# Patient Record
Sex: FEMALE | Race: WHITE | NOT HISPANIC OR LATINO | Employment: STUDENT | URBAN - METROPOLITAN AREA
[De-identification: names, ages, dates, MRNs, and addresses within clinical notes are randomized per-mention and may not be internally consistent; named-entity substitution may affect disease eponyms.]

---

## 2018-05-07 ENCOUNTER — APPOINTMENT (EMERGENCY)
Dept: CT IMAGING | Facility: HOSPITAL | Age: 43
End: 2018-05-07
Payer: COMMERCIAL

## 2018-05-07 ENCOUNTER — HOSPITAL ENCOUNTER (EMERGENCY)
Facility: HOSPITAL | Age: 43
Discharge: HOME | End: 2018-05-08
Attending: EMERGENCY MEDICINE | Admitting: EMERGENCY MEDICINE
Payer: COMMERCIAL

## 2018-05-07 DIAGNOSIS — S06.0X1A CONCUSSION WITH LOSS OF CONSCIOUSNESS OF 30 MINUTES OR LESS, INITIAL ENCOUNTER: ICD-10-CM

## 2018-05-07 DIAGNOSIS — K04.7 DENTAL ABSCESS: ICD-10-CM

## 2018-05-07 DIAGNOSIS — S09.90XA CLOSED HEAD INJURY, INITIAL ENCOUNTER: Primary | ICD-10-CM

## 2018-05-07 PROCEDURE — 72125 CT NECK SPINE W/O DYE: CPT

## 2018-05-07 PROCEDURE — 70450 CT HEAD/BRAIN W/O DYE: CPT

## 2018-05-07 RX ORDER — HYDROCODONE BITARTRATE AND ACETAMINOPHEN 5; 325 MG/1; MG/1
1 TABLET ORAL ONCE
Status: COMPLETED | OUTPATIENT
Start: 2018-05-07 | End: 2018-05-07

## 2018-05-07 RX ORDER — GINSENG 100 MG
1 CAPSULE ORAL ONCE
Status: COMPLETED | OUTPATIENT
Start: 2018-05-07 | End: 2018-05-07

## 2018-05-07 RX ADMIN — HYDROCODONE BITARTRATE AND ACETAMINOPHEN 1 TABLET: 5; 325 TABLET ORAL at 23:38

## 2018-05-07 RX ADMIN — BACITRACIN ZINC 1 LARGE APPLICATION: 500 OINTMENT TOPICAL at 23:39

## 2018-05-08 VITALS
TEMPERATURE: 99.3 F | SYSTOLIC BLOOD PRESSURE: 104 MMHG | WEIGHT: 175 LBS | RESPIRATION RATE: 18 BRPM | DIASTOLIC BLOOD PRESSURE: 70 MMHG | HEART RATE: 89 BPM | BODY MASS INDEX: 34.18 KG/M2 | OXYGEN SATURATION: 98 %

## 2018-05-08 PROCEDURE — 99284 EMERGENCY DEPT VISIT MOD MDM: CPT

## 2018-05-08 RX ORDER — HYDROCODONE BITARTRATE AND ACETAMINOPHEN 5; 325 MG/1; MG/1
1 TABLET ORAL EVERY 6 HOURS PRN
Qty: 12 TABLET | Refills: 0 | Status: SHIPPED | OUTPATIENT
Start: 2018-05-08

## 2018-05-08 RX ORDER — CLINDAMYCIN HYDROCHLORIDE 300 MG/1
300 CAPSULE ORAL 4 TIMES DAILY
Qty: 40 CAPSULE | Refills: 0 | Status: SHIPPED | OUTPATIENT
Start: 2018-05-08 | End: 2018-05-18

## 2018-05-08 NOTE — DISCHARGE INSTRUCTIONS
Concussion   WHAT YOU NEED TO KNOW:   A concussion is a mild brain injury  It is usually caused by a bump or blow to the head from a fall, a motor vehicle crash, or a sports injury  Sometimes being shaken forcefully may cause a concussion  DISCHARGE INSTRUCTIONS:   Have someone else call 911 for the following:   · Someone tries to wake you and cannot do so  · You have a seizure, increasing confusion, or a change in personality  · Your speech becomes slurred, or you have new vision problems  Return to the emergency department if:   · You have a severe headache that does not go away  · You have arm or leg weakness, numbness, or new problems with coordination  · You have blood or clear fluid coming out of the ears or nose  Contact your healthcare provider if:   · You have nausea or are vomiting  · You feel more sleepy than usual     · Your symptoms get worse  · Your symptoms last longer than 6 weeks after the injury  · You have questions or concerns about your condition or care  Medicines:   · Acetaminophen  helps to decrease pain  It is available without a doctor's order  Ask how much to take and how often to take it  Follow directions  Acetaminophen can cause liver damage if not taken correctly  · NSAIDs , such as ibuprofen, help decrease swelling and pain  NSAIDs can cause stomach bleeding or kidney problems in certain people  If you take blood thinner medicine, always ask your healthcare provider if NSAIDs are safe for you  Always read the medicine label and follow directions  · Take your medicine as directed  Contact your healthcare provider if you think your medicine is not helping or if you have side effects  Tell him or her if you are allergic to any medicine  Keep a list of the medicines, vitamins, and herbs you take  Include the amounts, and when and why you take them  Bring the list or the pill bottles to follow-up visits   Carry your medicine list with you in case of an emergency  Follow up with your healthcare provider as directed:  Write down your questions so you remember to ask them during your visits  Self-care:   · Rest  from physical and mental activities as directed  Mental activities are those that require thinking, concentration, and attention  You will need to rest until your symptoms are gone  Rest will allow you to recover from your concussion  Ask your healthcare provider when you can return to work and other daily activities  · Have someone stay with you for the first 24 hours after your injury  Your healthcare provider should be contacted if your symptoms get worse, or you develop new symptoms  · Do not participate in sports and physical activities until your healthcare provider says it is okay  They could make your symptoms worse or lead to another concussion  Your healthcare provider will tell you when it is okay for you to return to sports or physical activities  Prevent another concussion:   · Wear protective sports equipment that fit properly  Helmets help decrease your risk of a serious brain injury  Talk to your healthcare provider about ways you can decrease your risk for a concussion if you play sports  · Wear your seat belt  every time you travel  This helps to decrease your risk of a head injury if you are in a car accident  © 2017 [facility] Information is for End User's use only and may not be sold, redistributed or otherwise used for commercial purposes  All illustrations and images included in CareNotes® are the copyrighted property of [employer]  or [employer]  The above information is an  only  It is not intended as medical advice for individual conditions or treatments  Talk to your doctor, nurse or pharmacist before following any medical regimen to see if it is safe and effective for you      Dental Abscess   WHAT YOU NEED TO KNOW:   A dental abscess is a collection of pus in or around a tooth  A dental abscess is caused by bacteria  The bacteria usually enter the tooth when the enamel (outer part of the tooth) is damaged by tooth decay  Bacteria may also enter the tooth through a break or chip in the tooth, or a cut in the gum  Food particles that are stuck between the teeth for a long time may also lead to an abscess  DISCHARGE INSTRUCTIONS:   Return to the emergency department if:   · You have severe pain  · You have trouble breathing because of pain or swelling  Contact your healthcare provider if:   · Your symptoms get worse, even after treatment  · Your mouth is bleeding  · You cannot eat or drink because of pain or swelling  · Your abscess returns  · You have an injury that causes a crack in your tooth  · You have questions or concerns about your condition or care  Medicines: You may  need any of the following:  · Antibiotics  help treat a bacterial infection  · NSAIDs , such as ibuprofen, help decrease swelling, pain, and fever  This medicine is available with or without a doctor's order  NSAIDs can cause stomach bleeding or kidney problems in certain people  If you take blood thinner medicine, always ask your healthcare provider if NSAIDs are safe for you  Always read the medicine label and follow directions  · Acetaminophen  decreases pain and fever  It is available without a doctor's order  Ask how much to take and how often to take it  Follow directions  Read the labels of all other medicines you are using to see if they also contain acetaminophen, or ask your doctor or pharmacist  Acetaminophen can cause liver damage if not taken correctly  Do not use more than 4 grams (4,000 milligrams) total of acetaminophen in one day  · Prescription pain medicine  may be given  Ask your healthcare provider how to take this medicine safely  Some prescription pain medicines contain acetaminophen   Do not take other medicines that contain acetaminophen without talking to your healthcare provider  Too much acetaminophen may cause liver damage  Prescription pain medicine may cause constipation  Ask your healthcare provider how to prevent or treat constipation  · Take your medicine as directed  Contact your healthcare provider if you think your medicine is not helping or if you have side effects  Tell him of her if you are allergic to any medicine  Keep a list of the medicines, vitamins, and herbs you take  Include the amounts, and when and why you take them  Bring the list or the pill bottles to follow-up visits  Carry your medicine list with you in case of an emergency  Self-care:   · Rinse your mouth every 2 hours with salt water  This will help keep the area clean  · Gently brush your teeth twice a day with a soft tooth brush  This will help keep the area clean  · Eat soft foods as directed  Soft foods may cause less pain  Examples include applesauce, yogurt, and cooked pasta  Ask your healthcare provider how long to follow this instruction  · Apply a warm compress to your tooth or gum  Use a cotton ball or gauze soaked in warm water  Remove the compress in 10 minutes or when it becomes cool  Repeat 3 times a day  Prevent another abscess:   · Brush your teeth at least 2 times a day with fluoride toothpaste  · Use dental floss to clean between your teeth at least once a day  · Rinse your mouth with water or mouthwash after meals and snacks  · Chew sugarless gum after meals and snacks  · Limit foods that are sticky and high in sugar such as raisons  Also limit drinks high in sugar, such as soda  · See your dentist every 6 months for dental cleanings and oral exams  Follow up with your healthcare provider in 24 hours: Your healthcare provider will need to check your teeth and gums  Write down your questions so you remember to ask them during your visits     © 2017 [employer] Information is for End User's use only and may not be sold, redistributed or otherwise used for commercial purposes  All illustrations and images included in CareNotes® are the copyrighted property of [employer]  or Jose Rodriguez  The above information is an  only  It is not intended as medical advice for individual conditions or treatments  Talk to your doctor, nurse or pharmacist before following any medical regimen to see if it is safe and effective for you

## 2018-05-08 NOTE — ED NOTES
Patient transported to [facility] [address] [place] [address]  05/07/18 1443

## 2018-08-31 ENCOUNTER — HOSPITAL ENCOUNTER (EMERGENCY)
Facility: HOSPITAL | Age: 43
Discharge: HOME | End: 2018-09-01
Attending: EMERGENCY MEDICINE
Payer: COMMERCIAL

## 2018-08-31 DIAGNOSIS — R10.9 ABDOMINAL PAIN: Primary | ICD-10-CM

## 2018-08-31 DIAGNOSIS — K57.92 DIVERTICULITIS: ICD-10-CM

## 2018-08-31 LAB
BACTERIA UR QL AUTO: ABNORMAL /HPF
BASOPHILS # BLD AUTO: 0.04 THOUSANDS/ΜL (ref 0–0.1)
BASOPHILS NFR BLD AUTO: 1 % (ref 0–1)
BILIRUB UR QL STRIP: NEGATIVE
CLARITY UR: CLEAR
COLOR UR: YELLOW
EOSINOPHIL # BLD AUTO: 0.12 THOUSAND/ΜL (ref 0–0.61)
EOSINOPHIL NFR BLD AUTO: 2 % (ref 0–6)
ERYTHROCYTE [DISTWIDTH] IN BLOOD BY AUTOMATED COUNT: 12.7 % (ref 11.6–15.1)
GLUCOSE UR STRIP-MCNC: NEGATIVE MG/DL
HCT VFR BLD AUTO: 42.8 % (ref 34.8–46.1)
HGB BLD-MCNC: 14.4 G/DL (ref 11.5–15.4)
HGB UR QL STRIP.AUTO: ABNORMAL
IMM GRANULOCYTES # BLD AUTO: 0.04 THOUSAND/UL (ref 0–0.2)
IMM GRANULOCYTES NFR BLD AUTO: 1 % (ref 0–2)
KETONES UR STRIP-MCNC: ABNORMAL MG/DL
LEUKOCYTE ESTERASE UR QL STRIP: NEGATIVE
LYMPHOCYTES # BLD AUTO: 1.84 THOUSANDS/ΜL (ref 0.6–4.47)
LYMPHOCYTES NFR BLD AUTO: 23 % (ref 14–44)
MCH RBC QN AUTO: 30.1 PG (ref 26.8–34.3)
MCHC RBC AUTO-ENTMCNC: 33.6 G/DL (ref 31.4–37.4)
MCV RBC AUTO: 90 FL (ref 82–98)
MONOCYTES # BLD AUTO: 0.56 THOUSAND/ΜL (ref 0.17–1.22)
MONOCYTES NFR BLD AUTO: 7 % (ref 4–12)
NEUTROPHILS # BLD AUTO: 5.49 THOUSANDS/ΜL (ref 1.85–7.62)
NEUTS SEG NFR BLD AUTO: 66 % (ref 43–75)
NITRITE UR QL STRIP: NEGATIVE
NON-SQ EPI CELLS URNS QL MICRO: ABNORMAL /HPF
NRBC BLD AUTO-RTO: 0 /100 WBCS
PH UR STRIP.AUTO: 6 [PH] (ref 4.5–8)
PLATELET # BLD AUTO: 274 THOUSANDS/UL (ref 149–390)
PMV BLD AUTO: 10.8 FL (ref 8.9–12.7)
PROT UR STRIP-MCNC: NEGATIVE MG/DL
RBC # BLD AUTO: 4.78 MILLION/UL (ref 3.81–5.12)
RBC #/AREA URNS AUTO: ABNORMAL /HPF
SP GR UR STRIP.AUTO: 1.02 (ref 1–1.03)
UROBILINOGEN UR QL STRIP.AUTO: 1 E.U./DL
WBC # BLD AUTO: 8.09 THOUSAND/UL (ref 4.31–10.16)
WBC #/AREA URNS AUTO: ABNORMAL /HPF

## 2018-08-31 PROCEDURE — 81001 URINALYSIS AUTO W/SCOPE: CPT | Performed by: EMERGENCY MEDICINE

## 2018-08-31 PROCEDURE — 36415 COLL VENOUS BLD VENIPUNCTURE: CPT | Performed by: EMERGENCY MEDICINE

## 2018-08-31 PROCEDURE — 83605 ASSAY OF LACTIC ACID: CPT | Performed by: EMERGENCY MEDICINE

## 2018-08-31 PROCEDURE — 85025 COMPLETE CBC W/AUTO DIFF WBC: CPT | Performed by: EMERGENCY MEDICINE

## 2018-08-31 PROCEDURE — 96375 TX/PRO/DX INJ NEW DRUG ADDON: CPT

## 2018-08-31 PROCEDURE — 80053 COMPREHEN METABOLIC PANEL: CPT | Performed by: EMERGENCY MEDICINE

## 2018-08-31 PROCEDURE — 96361 HYDRATE IV INFUSION ADD-ON: CPT

## 2018-08-31 RX ORDER — ONDANSETRON 2 MG/ML
4 INJECTION INTRAMUSCULAR; INTRAVENOUS ONCE
Status: COMPLETED | OUTPATIENT
Start: 2018-08-31 | End: 2018-08-31

## 2018-08-31 RX ADMIN — SODIUM CHLORIDE 1000 ML: 0.9 INJECTION, SOLUTION INTRAVENOUS at 23:44

## 2018-08-31 RX ADMIN — HYDROMORPHONE HYDROCHLORIDE 1 MG: 1 INJECTION, SOLUTION INTRAMUSCULAR; INTRAVENOUS; SUBCUTANEOUS at 23:46

## 2018-08-31 RX ADMIN — ONDANSETRON 4 MG: 2 INJECTION INTRAMUSCULAR; INTRAVENOUS at 23:45

## 2018-09-01 ENCOUNTER — APPOINTMENT (EMERGENCY)
Dept: CT IMAGING | Facility: HOSPITAL | Age: 43
End: 2018-09-01
Payer: COMMERCIAL

## 2018-09-01 VITALS
TEMPERATURE: 98.4 F | DIASTOLIC BLOOD PRESSURE: 75 MMHG | RESPIRATION RATE: 18 BRPM | OXYGEN SATURATION: 96 % | SYSTOLIC BLOOD PRESSURE: 120 MMHG | HEART RATE: 100 BPM | WEIGHT: 175 LBS | HEIGHT: 60 IN | BODY MASS INDEX: 34.36 KG/M2

## 2018-09-01 LAB
ALBUMIN SERPL BCP-MCNC: 3.7 G/DL (ref 3.5–5)
ALP SERPL-CCNC: 61 U/L (ref 46–116)
ALT SERPL W P-5'-P-CCNC: 36 U/L (ref 12–78)
ANION GAP SERPL CALCULATED.3IONS-SCNC: 5 MMOL/L (ref 4–13)
AST SERPL W P-5'-P-CCNC: 20 U/L (ref 5–45)
BILIRUB SERPL-MCNC: 0.2 MG/DL (ref 0.2–1)
BUN SERPL-MCNC: 12 MG/DL (ref 5–25)
CALCIUM SERPL-MCNC: 9.3 MG/DL (ref 8.3–10.1)
CHLORIDE SERPL-SCNC: 103 MMOL/L (ref 100–108)
CO2 SERPL-SCNC: 30 MMOL/L (ref 21–32)
CREAT SERPL-MCNC: 0.77 MG/DL (ref 0.6–1.3)
GFR SERPL CREATININE-BSD FRML MDRD: 94 ML/MIN/1.73SQ M
GLUCOSE SERPL-MCNC: 118 MG/DL (ref 65–140)
LACTATE SERPL-SCNC: 0.9 MMOL/L (ref 0.5–2)
POTASSIUM SERPL-SCNC: 3.3 MMOL/L (ref 3.5–5.3)
PROT SERPL-MCNC: 7.3 G/DL (ref 6.4–8.2)
SODIUM SERPL-SCNC: 138 MMOL/L (ref 136–145)

## 2018-09-01 PROCEDURE — 96368 THER/DIAG CONCURRENT INF: CPT

## 2018-09-01 PROCEDURE — 96365 THER/PROPH/DIAG IV INF INIT: CPT

## 2018-09-01 PROCEDURE — 96366 THER/PROPH/DIAG IV INF ADDON: CPT

## 2018-09-01 PROCEDURE — 74177 CT ABD & PELVIS W/CONTRAST: CPT

## 2018-09-01 PROCEDURE — 99285 EMERGENCY DEPT VISIT HI MDM: CPT

## 2018-09-01 PROCEDURE — 96361 HYDRATE IV INFUSION ADD-ON: CPT

## 2018-09-01 PROCEDURE — 96376 TX/PRO/DX INJ SAME DRUG ADON: CPT

## 2018-09-01 RX ORDER — METRONIDAZOLE 500 MG/1
500 TABLET ORAL EVERY 8 HOURS SCHEDULED
Qty: 30 TABLET | Refills: 0 | Status: SHIPPED | OUTPATIENT
Start: 2018-09-01 | End: 2018-09-11

## 2018-09-01 RX ORDER — LEVOFLOXACIN 750 MG/1
750 TABLET ORAL EVERY 24 HOURS
Qty: 10 TABLET | Refills: 0 | Status: SHIPPED | OUTPATIENT
Start: 2018-09-01 | End: 2018-09-11

## 2018-09-01 RX ORDER — LEVOFLOXACIN 5 MG/ML
750 INJECTION, SOLUTION INTRAVENOUS ONCE
Status: COMPLETED | OUTPATIENT
Start: 2018-09-01 | End: 2018-09-01

## 2018-09-01 RX ORDER — OXYCODONE HYDROCHLORIDE AND ACETAMINOPHEN 5; 325 MG/1; MG/1
1-2 TABLET ORAL EVERY 4 HOURS PRN
Qty: 20 TABLET | Refills: 0 | Status: SHIPPED | OUTPATIENT
Start: 2018-09-01 | End: 2018-09-11

## 2018-09-01 RX ORDER — POTASSIUM CHLORIDE 20 MEQ/1
20 TABLET, EXTENDED RELEASE ORAL ONCE
Status: COMPLETED | OUTPATIENT
Start: 2018-09-01 | End: 2018-09-01

## 2018-09-01 RX ADMIN — METRONIDAZOLE 500 MG: 500 INJECTION, SOLUTION INTRAVENOUS at 02:02

## 2018-09-01 RX ADMIN — HYDROMORPHONE HYDROCHLORIDE 1 MG: 1 INJECTION, SOLUTION INTRAMUSCULAR; INTRAVENOUS; SUBCUTANEOUS at 02:02

## 2018-09-01 RX ADMIN — HYDROMORPHONE HYDROCHLORIDE 1 MG: 1 INJECTION, SOLUTION INTRAMUSCULAR; INTRAVENOUS; SUBCUTANEOUS at 04:21

## 2018-09-01 RX ADMIN — LEVOFLOXACIN 750 MG: 5 INJECTION, SOLUTION INTRAVENOUS at 02:30

## 2018-09-01 RX ADMIN — POTASSIUM CHLORIDE 20 MEQ: 1500 TABLET, EXTENDED RELEASE ORAL at 00:11

## 2018-09-01 RX ADMIN — IOHEXOL 100 ML: 350 INJECTION, SOLUTION INTRAVENOUS at 00:40

## 2018-09-01 NOTE — ED CARE HANDOFF
Emergency Department Sign Out Note        Sign out and transfer of care from Dr Sean Pollard  See Separate Emergency Department note  The patient, Selene Johns, was evaluated by the previous provider for abdominal pain  Workup Completed:  Labs, CT A/P     ED Course / Workup Pending (followup):  reeval after IV antibiotics to determine admit vs d/c based on how patient feels  Procedures  MDM  Number of Diagnoses or Management Options  Abdominal pain:   Diverticulitis:   Diagnosis management comments: 4:17 AM  Patient states shes' still having pain but would prefer to go home  RTED instructions reviewed  Discharge instructions provided by Dr Sean Pollard  Amount and/or Complexity of Data Reviewed  Review and summarize past medical records: yes    Risk of Complications, Morbidity, and/or Mortality  Presenting problems: high  Diagnostic procedures: high  Management options: moderate    Patient Progress  Patient progress: stable    CritCare Time      Disposition  Final diagnoses:   Abdominal pain   Diverticulitis     Time reflects when diagnosis was documented in both MDM as applicable and the Disposition within this note     Time User Action Codes Description Comment    8/31/2018 11:37 PM Juan Bean Add [R10 9] Abdominal pain     9/1/2018  1:57 AM Juan Bean Add [K57 92] Diverticulitis       ED Disposition     ED Disposition Condition Comment    Discharge  Selene Johns discharge to home/self care      Condition at discharge: Stable        Follow-up Information     Follow up With Specialties Details [facility] Emergency Medicine Go to If symptoms worsen:  Worsening abdominal pain, fever, chills, vomiting, lightheadedness, passing out, more blood in her bowel movements, etc  hospitals [facility] [address]  [phone] [place] ED, [address], [place], [address] [facility],  Internal Medicine Schedule an appointment as soon as possible for a visit For follow-up to ensure improvement Pattiee [place] [address]  [facility] [address] [facility] [place] MD Gastroenterology Schedule an appointment as soon as possible for a visit For GI follow-up [address]  [facility] [phone]           Patient's Medications   Discharge Prescriptions    LEVOFLOXACIN (LEVAQUIN) 750 MG TABLET    Take 1 tablet (750 mg total) by mouth every 24 hours for 10 days       Start Date: 9/1/2018  End Date: 9/11/2018       Order Dose: 750 mg       Quantity: 10 tablet    Refills: 0    METRONIDAZOLE (FLAGYL) 500 MG TABLET    Take 1 tablet (500 mg total) by mouth every 8 (eight) hours for 10 days       Start Date: 9/1/2018  End Date: 9/11/2018       Order Dose: 500 mg       Quantity: 30 tablet    Refills: 0    OXYCODONE-ACETAMINOPHEN (PERCOCET) 5-325 MG PER TABLET    Take 1-2 tablets by mouth every 4 (four) hours as needed for severe pain for up to 10 days Max Daily Amount: 12 tablets       Start Date: 9/1/2018  End Date: 9/11/2018       Order Dose: 1-2 tablets       Quantity: 20 tablet    Refills: 0     No discharge procedures on file         ED Provider  Electronically Signed by     Javy Smith DO  09/01/18 4696

## 2018-09-01 NOTE — ED PROVIDER NOTES
History  Chief Complaint   Patient presents with    Rectal Bleeding     Pt c/o LLQ abdominal pain with rectal bleeding  Pt has hx of diverticulitis but hasn't had a flare up in a while  Pt states this has been going on for over a week but getting worse with a low grade fever at home  68-year-old female presents for evaluation of left lower quadrant abdominal pain associated with rectal bleeding  She states that she has been having pain for the past week, she was hoping it could be alleviated at home by using a liquid diet  However her pain continues to worsen and she now presents here for evaluation and treatment  She past she has also required sigmoid colon resection secondary to her diverticulitis  She had been advised at one point for a total colectomy however she refused, and states that has been a few years since she has had a diverticulitis flare up  She denies dysuria  She states that she has not had a solid bowel movement in approximately 1 week, she has blood in the toilet every time she attempts to have a bowel movement, also clots  She states she always bleeds some with her diverticulitis but believes that this is worse  Prior to Admission Medications   Prescriptions Last Dose Informant Patient Reported? Taking?    ALPRAZolam (XANAX) 0 5 mg tablet   Yes No   Sig: Take 0 5 mg by mouth as needed for anxiety   HYDROcodone-acetaminophen (NORCO) 5-325 mg per tablet   No No   Sig: Take 1 tablet by mouth every 6 (six) hours as needed (Not relieved by Anti-inflammatory) for up to 12 doses Max Daily Amount: 4 tablets   pregabalin (LYRICA) 75 mg capsule   No No   Sig: Take 1 capsule by mouth 2 (two) times a day for 10 days   valACYclovir (VALTREX) 1,000 mg tablet   No No   Sig: Take 1 tablet by mouth every 8 (eight) hours for 5 days      Facility-Administered Medications: None       Past Medical History:   Diagnosis Date    Atrial septal defect     Mitral valve prolapse        Past Surgical History:   Procedure Laterality Date    APPENDECTOMY       SECTION      CHOLECYSTECTOMY      HYSTERECTOMY      SIGMOID RESECTION / RECTOPEXY         Family History   Problem Relation Age of Onset    Adopted: Yes    No Known Problems Mother     No Known Problems Father      I have reviewed and agree with the history as documented  Social History   Substance Use Topics    Smoking status: Current Every Day Smoker     Packs/day: 0 50     Types: Cigarettes    Smokeless tobacco: Never Used    Alcohol use No        Review of Systems   Constitutional: Positive for fatigue  Negative for chills and fever  HENT: Negative for congestion and sore throat  Respiratory: Negative for cough, chest tightness and shortness of breath  Cardiovascular: Negative for chest pain and palpitations  Gastrointestinal: Positive for abdominal distention, abdominal pain (Left lower quadrant), blood in stool and nausea  Negative for anal bleeding, constipation, diarrhea and vomiting  Genitourinary: Negative for dysuria, flank pain, vaginal bleeding, vaginal discharge and vaginal pain  Musculoskeletal: Negative for back pain and neck pain  Skin: Negative for color change and rash  Allergic/Immunologic: Negative for immunocompromised state  Neurological: Negative for dizziness, syncope and headaches  Physical Exam  Physical Exam   Constitutional: She is oriented to person, place, and time  She appears well-developed and well-nourished  She appears distressed (Secondary to pain)  HENT:   Head: Normocephalic and atraumatic  Mouth/Throat: Oropharynx is clear and moist    Eyes: Conjunctivae and EOM are normal  Pupils are equal, round, and reactive to light  Right eye exhibits no discharge  Left eye exhibits no discharge  No scleral icterus  Neck: Normal range of motion  Neck supple  No JVD present  Cardiovascular: Regular rhythm, normal heart sounds and intact distal pulses    Exam reveals no gallop and no friction rub  No murmur heard  Mild tachycardia   Pulmonary/Chest: Effort normal and breath sounds normal  No respiratory distress  She has no wheezes  She has no rales  She exhibits no tenderness  Abdominal: Soft  Bowel sounds are normal  She exhibits distension  There is tenderness ( left lower quadrant)  There is guarding ( left lower quadrant)  There is no rebound  Musculoskeletal: Normal range of motion  She exhibits no edema, tenderness or deformity  Neurological: She is alert and oriented to person, place, and time  No cranial nerve deficit or sensory deficit  Skin: Skin is warm and dry  No rash noted  She is not diaphoretic  No erythema  No pallor  Psychiatric: She has a normal mood and affect  Her behavior is normal    Vitals reviewed        Vital Signs  ED Triage Vitals   Temperature Pulse Respirations Blood Pressure SpO2   08/31/18 2248 08/31/18 2247 08/31/18 2247 08/31/18 2247 08/31/18 2247   98 4 °F (36 9 °C) (!) 106 20 158/97 99 %      Temp Source Heart Rate Source Patient Position - Orthostatic VS BP Location FiO2 (%)   08/31/18 2248 08/31/18 2247 08/31/18 2247 08/31/18 2247 --   Oral Monitor Lying Right arm       Pain Score       08/31/18 2247       8           Vitals:    08/31/18 2247 09/01/18 0129 09/01/18 0425   BP: 158/97 123/81 120/75   Pulse: (!) 106 98 100   Patient Position - Orthostatic VS: Lying Sitting Sitting       Visual Acuity      ED Medications  Medications   sodium chloride 0 9 % bolus 1,000 mL (0 mL Intravenous Stopped 9/1/18 0202)   HYDROmorphone (DILAUDID) injection 1 mg (1 mg Intravenous Given 8/31/18 2346)   ondansetron (ZOFRAN) injection 4 mg (4 mg Intravenous Given 8/31/18 2345)   potassium chloride (K-DUR,KLOR-CON) CR tablet 20 mEq (20 mEq Oral Given 9/1/18 0011)   iohexol (OMNIPAQUE) 350 MG/ML injection (MULTI-DOSE) 100 mL (100 mL Intravenous Given 9/1/18 0040)   HYDROmorphone (DILAUDID) injection 1 mg (1 mg Intravenous Given 9/1/18 0202) metroNIDAZOLE (FLAGYL) IVPB (premix) 500 mg (0 mg Intravenous Stopped 9/1/18 0418)   levofloxacin (LEVAQUIN) IVPB (premix) 750 mg (0 mg Intravenous Stopped 9/1/18 0418)   HYDROmorphone (DILAUDID) injection 1 mg (1 mg Intravenous Given 9/1/18 0421)       Diagnostic Studies  Results Reviewed     Procedure Component Value Units Date/Time    Lactic acid, plasma [[id number]]  (Normal) Collected:  08/31/18 2340    Lab Status:  Final result Specimen:  Blood from Arm, Right Updated:  09/01/18 0008     LACTIC ACID 0 9 mmol/L     Narrative:         Result may be elevated if tourniquet was used during collection  Comprehensive metabolic panel [[id number]]  (Abnormal) Collected:  08/31/18 2340    Lab Status:  Final result Specimen:  Blood from Arm, Right Updated:  09/01/18 0005     Sodium 138 mmol/L      Potassium 3 3 (L) mmol/L      Chloride 103 mmol/L      CO2 30 mmol/L      ANION GAP 5 mmol/L      BUN 12 mg/dL      Creatinine 0 77 mg/dL      Glucose 118 mg/dL      Calcium 9 3 mg/dL      AST 20 U/L      ALT 36 U/L      Alkaline Phosphatase 61 U/L      Total Protein 7 3 g/dL      Albumin 3 7 g/dL      Total Bilirubin 0 20 mg/dL      eGFR 94 ml/min/1 73sq m     Narrative:         National Kidney Disease Education Program recommendations are as follows:  GFR calculation is accurate only with a steady state creatinine  Chronic Kidney disease less than 60 ml/min/1 73 sq  meters  Kidney failure less than 15 ml/min/1 73 sq  meters      Urine Microscopic [[id number]]  (Abnormal) Collected:  08/31/18 2340    Lab Status:  Final result Specimen:  Urine from Urine, Clean Catch Updated:  08/31/18 2359     RBC, UA 1-2 (A) /hpf      WBC, UA None Seen /hpf      Epithelial Cells Occasional /hpf      Bacteria, UA None Seen /hpf     Urinalysis with reflex to microscopic [[id number]]  (Abnormal) Collected:  08/31/18 2340    Lab Status:  Final result Specimen:  Urine from Urine, Clean Catch Updated:  08/31/18 2349     Color, UA Yellow     Clarity, UA Clear     Specific Gravity, UA 1 025     pH, UA 6 0     Leukocytes, UA Negative     Nitrite, UA Negative     Protein, UA Negative mg/dl      Glucose, UA Negative mg/dl      Ketones, UA Trace (A) mg/dl      Urobilinogen, UA 1 0 E U /dl      Bilirubin, UA Negative     Blood, UA Small (A)    CBC and differential [[id number]] Collected:  08/31/18 2340    Lab Status:  Final result Specimen:  Blood from Arm, Right Updated:  08/31/18 2347     WBC 8 09 Thousand/uL      RBC 4 78 Million/uL      Hemoglobin 14 4 g/dL      Hematocrit 42 8 %      MCV 90 fL      MCH 30 1 pg      MCHC 33 6 g/dL      RDW 12 7 %      MPV 10 8 fL      Platelets 253 Thousands/uL      nRBC 0 /100 WBCs      Neutrophils Relative 66 %      Immat GRANS % 1 %      Lymphocytes Relative 23 %      Monocytes Relative 7 %      Eosinophils Relative 2 %      Basophils Relative 1 %      Neutrophils Absolute 5 49 Thousands/µL      Immature Grans Absolute 0 04 Thousand/uL      Lymphocytes Absolute 1 84 Thousands/µL      Monocytes Absolute 0 56 Thousand/µL      Eosinophils Absolute 0 12 Thousand/µL      Basophils Absolute 0 04 Thousands/µL                  CT abdomen pelvis with contrast   ED Interpretation by Ericka Cardoso DO (09/01 0127)   3 mm nonobstructing stone the lower pole the right kidney        Fatty liver        Diverticulosis without evidence for diverticulitis  Final Result by Terrance Adhikari MD (09/01 0056)      3 mm nonobstructing stone the lower pole the right kidney  Fatty liver  Diverticulosis without evidence for diverticulitis  Workstation performed: [id number]                    Procedures  Procedures       Phone Contacts  ED Phone Contact    ED Course  ED Course as of Sep 05 1507   Sat Sep 01, 2018   0005 Potassium: (!) 3 3   0006 Getting IV fluid Pulse: (!) 106   0126 Hemoglobin: 14 4   0133 Patient's pain has worsened after the scan  Will give pain control    Will treat for diverticulitis    0156 Updated patient on the findings here  Patient will be given some IV antibiotics here and reassessed  As long as she is feeling improved she will be able to be discharged home with antibiotics and pain control for home  MDM  Number of Diagnoses or Management Options  Abdominal pain:   Diverticulitis:   Diagnosis management comments: Abdominal pain, likely diverticulitis flare up  Patient will have abdominal lab work performed including a lactate to look for ischemic bowel, imaging to evaluate for diverticulitis, abscess formation, or postsurgical complications, urinalysis, symptomatic treatment  Workup is negative however this was the same workup that patient was noted to have diverticulitis on further exam   Patient will be treated for diverticulitis  She is offered admission for her rectal bleeding however she would like to be discharged home  She will be discharged home with good follow-up and good return precautions in case of worsening condition  Amount and/or Complexity of Data Reviewed  Clinical lab tests: ordered and reviewed  Tests in the radiology section of CPT®: ordered and reviewed      CritCare Time    Disposition  Final diagnoses:   Abdominal pain   Diverticulitis     Time reflects when diagnosis was documented in both MDM as applicable and the Disposition within this note     Time User Action Codes Description Comment    8/31/2018 11:37 PM Howard Bean Add [R10 9] Abdominal pain     9/1/2018  1:57 AM Howard Bean Add [K57 92] Diverticulitis       ED Disposition     ED Disposition Condition Comment    Discharge  [address] [address] discharge to home/self care      Condition at discharge: Stable        Follow-up Information     Follow up With Specialties Details Why [facility] Emergency Medicine Go to If symptoms worsen:  Worsening abdominal pain, fever, chills, vomiting, lightheadedness, passing out, more blood in her bowel movements, etc  [address] [facility] [place] ED, [address] [place], [place], [facility],  Internal Medicine Schedule an appointment as soon as possible for a visit For follow-up to ensure improvement [address]  Chris Moe III, MD Gastroenterology Schedule an appointment as soon as possible for a visit For GI follow-up [place] [address]  [facility] [phone]             Discharge Medication List as of 9/1/2018  2:05 AM      START taking these medications    Details   levofloxacin (LEVAQUIN) 750 mg tablet Take 1 tablet (750 mg total) by mouth every 24 hours for 10 days, Starting Sat 9/1/2018, Until Tue 9/11/2018, Print      metroNIDAZOLE (FLAGYL) 500 mg tablet Take 1 tablet (500 mg total) by mouth every 8 (eight) hours for 10 days, Starting Sat 9/1/2018, Until Tue 9/11/2018, Print      oxyCODONE-acetaminophen (PERCOCET) 5-325 mg per tablet Take 1-2 tablets by mouth every 4 (four) hours as needed for severe pain for up to 10 days Max Daily Amount: 12 tablets, Starting Sat 9/1/2018, Until Tue 9/11/2018, Print         CONTINUE these medications which have NOT CHANGED    Details   ALPRAZolam (XANAX) 0 5 mg tablet Take 0 5 mg by mouth as needed for anxiety, Until Discontinued, Historical Med      HYDROcodone-acetaminophen ([facility]) 5-325 mg per tablet Take 1 tablet by mouth every 6 (six) hours as needed (Not relieved by Anti-inflammatory) for up to 12 doses Max Daily Amount: 4 tablets, Starting Tue 5/8/2018, Print      pregabalin (LYRICA) 75 mg capsule Take 1 capsule by mouth 2 (two) times a day for 10 days, Starting 10/20/2016, Until Sun 10/30/16, Print      valACYclovir (VALTREX) 1,000 mg tablet Take 1 tablet by mouth every 8 (eight) hours for 5 days, Starting 10/20/2016, Until Tue 10/25/16, Print           No discharge procedures on file      ED Provider  Electronically Signed by           Ephraim Aleman DO  09/05/18 5567

## 2018-09-01 NOTE — DISCHARGE INSTRUCTIONS
Abdominal Pain, Ambulatory Care   GENERAL INFORMATION:   Abdominal pain  can be dull, achy, or sharp  You may have pain in one area of your abdomen, or in your entire abdomen  Your pain may be caused by constipation, food sensitivity or poisoning, infection, or a blockage  Abdominal pain can also be caused by a hernia, appendicitis, or an ulcer  The cause of your abdominal pain may be unknown  Seek immediate care for the following symptoms:   · New chest pain or shortness of breath    · Pulsing pain in your upper abdomen or lower back that suddenly becomes constant    · Pain in the right lower abdominal area that worsens with movement    · Fever over 100 4°F (38°C) or shaking chills    · Vomiting and you cannot keep food or fluids down    · Pain does not improve or gets worse over the next 8 to 12 hours    · Blood in your vomit or bowel movements, or they look black and tarry    · Skin or the whites of your eyes turn yellow    · Large amount of vaginal bleeding that is not your monthly period  Treatment for abdominal pain  may include medicine to calm your stomach, prevent vomiting, or decrease pain  Follow up with your healthcare provider as directed:  Write down your questions so you remember to ask them during your visits  CARE AGREEMENT:   You have the right to help plan your care  Learn about your health condition and how it may be treated  Discuss treatment options with your caregivers to decide what care you want to receive  You always have the right to refuse treatment  The above information is an  only  It is not intended as medical advice for individual conditions or treatments  Talk to your doctor, nurse or pharmacist before following any medical regimen to see if it is safe and effective for you  © 2014 [address] is for End User's use only and may not be sold, redistributed or otherwise used for commercial purposes   All illustrations and images included in [facility] are the copyrighted property of [employer]  or Jose Rodriguez  Diverticulitis   WHAT YOU NEED TO KNOW:   What is diverticulitis? Diverticulitis is a condition that causes small pockets along your intestine called diverticula to become inflamed or infected  This is caused by hard bowel movement, food, or bacteria that get stuck in the pockets  What are the signs and symptoms of diverticulitis? · Pain in the lower left side of your abdomen    · Fever and chills    · Nausea or vomiting     · Constipation or diarrhea     · An urge to urinate or have a bowel movement more often than usual     · Bloody bowel movements    · Bloating and gas  How is diverticulitis diagnosed? Your healthcare provider will examine you  He or she will ask questions about your symptoms and health history  You may need any of the following:  · Blood and urine tests  may show signs of infection or inflammation  · CT scan or ultrasound  pictures may be used to find problems in your intestines  You may be given contrast liquid to help your intestines show up better in the pictures  Tell the healthcare provider if you have ever had an allergic reaction to contrast liquid  · A colonoscopy  is used to look at your intestines with a scope  A scope (long bendable tube with a light on the end) is used to take pictures  This test may show swollen diverticula or bleeding  Samples may be taken from your digestive tract and sent to a lab for tests  Bleeding may be controlled with tools that are inserted through the scope  How is diverticulitis treated? Mild diverticulitis can be treated at home  You may need to rest and follow a clear liquid diet until your diverticulitis gets better  You will be admitted to the hospital if you have severe diverticulitis  You may need any of the following:  · Antibiotics  help treat or prevent a bacterial infection  · Prescription pain medicine  may be given   Ask your healthcare provider how to take this medicine safely  Some prescription pain medicines contain acetaminophen  Do not take other medicines that contain acetaminophen without talking to your healthcare provider  Too much acetaminophen may cause liver damage  Prescription pain medicine may cause constipation  Ask your healthcare provider how to prevent or treat constipation  · An IV  may be used to give you liquids and nutrition  You may not be able to eat or drink anything until your healthcare provider says it is okay  · Drainage  may be done to reduce inflammation or treat infection  Your healthcare provider may insert a small tube through an incision in your abdomen to drain pus from infected diverticula  · Surgery  may be needed if there is a hole in your bowel or a large amount of swelling  A healthcare provider will remove the infected or inflamed areas of your colon  How can I manage my symptoms? A clear liquid diet will allow your intestines to heal  A clear liquid diet includes any liquids that you can see through  Examples include water, ginger-niru, cranberry or apple juice, frozen fruit ice, or broth  Ask your healthcare provider for more information on a clear liquid diet  When should I seek immediate care? · You have bowel movement or foul-smelling discharge leaking from your vagina or in your urine  · You have severe diarrhea  · You urinate less than usual or not at all  · You are not able to have a bowel movement  · You cannot stop vomiting  · You have severe abdominal pain, a fever, and your abdomen is larger than usual      · You have new or increased blood in your bowel movements  When should I contact my healthcare provider? · You have pain when you urinate  · Your symptoms get worse or do not go away  · You have questions or concerns about your condition or care  CARE AGREEMENT:   You have the right to help plan your care   Learn about your health condition and how it may be treated  Discuss treatment options with your caregivers to decide what care you want to receive  You always have the right to refuse treatment  The above information is an  only  It is not intended as medical advice for individual conditions or treatments  Talk to your doctor, nurse or pharmacist before following any medical regimen to see if it is safe and effective for you  © 2017 [address] Information is for End User's use only and may not be sold, redistributed or otherwise used for commercial purposes  All illustrations and images included in CareNotes® are the copyrighted property of A D A M , Inc  or Jose Rodriguez

## 2018-09-07 ENCOUNTER — APPOINTMENT (EMERGENCY)
Dept: CT IMAGING | Facility: HOSPITAL | Age: 43
DRG: 378 | End: 2018-09-07
Payer: COMMERCIAL

## 2018-09-07 ENCOUNTER — HOSPITAL ENCOUNTER (INPATIENT)
Facility: HOSPITAL | Age: 43
LOS: 1 days | Discharge: HOME | DRG: 378 | End: 2018-09-08
Attending: EMERGENCY MEDICINE | Admitting: FAMILY MEDICINE
Payer: COMMERCIAL

## 2018-09-07 DIAGNOSIS — R10.32 LEFT LOWER QUADRANT PAIN: ICD-10-CM

## 2018-09-07 DIAGNOSIS — K62.5 RECTAL BLEEDING: Primary | ICD-10-CM

## 2018-09-07 DIAGNOSIS — K57.90 DIVERTICULOSIS: ICD-10-CM

## 2018-09-07 DIAGNOSIS — K59.00 CONSTIPATION: ICD-10-CM

## 2018-09-07 PROBLEM — R10.9 FLANK PAIN: Status: ACTIVE | Noted: 2018-09-07

## 2018-09-07 LAB
ABO GROUP BLD: NORMAL
ALBUMIN SERPL BCP-MCNC: 4 G/DL (ref 3.5–5)
ALP SERPL-CCNC: 58 U/L (ref 46–116)
ALT SERPL W P-5'-P-CCNC: 38 U/L (ref 12–78)
ANION GAP SERPL CALCULATED.3IONS-SCNC: 8 MMOL/L (ref 4–13)
APTT PPP: 34 SECONDS (ref 24–36)
AST SERPL W P-5'-P-CCNC: 21 U/L (ref 5–45)
BASOPHILS # BLD AUTO: 0.05 THOUSANDS/ΜL (ref 0–0.1)
BASOPHILS NFR BLD AUTO: 1 % (ref 0–1)
BILIRUB SERPL-MCNC: 0.2 MG/DL (ref 0.2–1)
BLD GP AB SCN SERPL QL: NEGATIVE
BUN SERPL-MCNC: 13 MG/DL (ref 5–25)
CALCIUM SERPL-MCNC: 9 MG/DL (ref 8.3–10.1)
CHLORIDE SERPL-SCNC: 103 MMOL/L (ref 100–108)
CO2 SERPL-SCNC: 27 MMOL/L (ref 21–32)
CREAT SERPL-MCNC: 0.76 MG/DL (ref 0.6–1.3)
EOSINOPHIL # BLD AUTO: 0.15 THOUSAND/ΜL (ref 0–0.61)
EOSINOPHIL NFR BLD AUTO: 2 % (ref 0–6)
ERYTHROCYTE [DISTWIDTH] IN BLOOD BY AUTOMATED COUNT: 13.1 % (ref 11.6–15.1)
GFR SERPL CREATININE-BSD FRML MDRD: 95 ML/MIN/1.73SQ M
GLUCOSE SERPL-MCNC: 89 MG/DL (ref 65–140)
HCT VFR BLD AUTO: 43.1 % (ref 34.8–46.1)
HGB BLD-MCNC: 14.6 G/DL (ref 11.5–15.4)
HOLD SPECIMEN: NORMAL
IMM GRANULOCYTES # BLD AUTO: 0.05 THOUSAND/UL (ref 0–0.2)
IMM GRANULOCYTES NFR BLD AUTO: 1 % (ref 0–2)
INR PPP: 0.98 (ref 0.86–1.17)
LACTATE SERPL-SCNC: 1.4 MMOL/L (ref 0.5–2)
LIPASE SERPL-CCNC: 92 U/L (ref 73–393)
LYMPHOCYTES # BLD AUTO: 2.5 THOUSANDS/ΜL (ref 0.6–4.47)
LYMPHOCYTES NFR BLD AUTO: 29 % (ref 14–44)
MCH RBC QN AUTO: 30.4 PG (ref 26.8–34.3)
MCHC RBC AUTO-ENTMCNC: 33.9 G/DL (ref 31.4–37.4)
MCV RBC AUTO: 90 FL (ref 82–98)
MONOCYTES # BLD AUTO: 0.68 THOUSAND/ΜL (ref 0.17–1.22)
MONOCYTES NFR BLD AUTO: 8 % (ref 4–12)
NEUTROPHILS # BLD AUTO: 5.2 THOUSANDS/ΜL (ref 1.85–7.62)
NEUTS SEG NFR BLD AUTO: 59 % (ref 43–75)
NRBC BLD AUTO-RTO: 0 /100 WBCS
PLATELET # BLD AUTO: 287 THOUSANDS/UL (ref 149–390)
PMV BLD AUTO: 10.8 FL (ref 8.9–12.7)
POTASSIUM SERPL-SCNC: 3.7 MMOL/L (ref 3.5–5.3)
PROT SERPL-MCNC: 7.6 G/DL (ref 6.4–8.2)
PROTHROMBIN TIME: 12.9 SECONDS (ref 11.8–14.2)
RBC # BLD AUTO: 4.81 MILLION/UL (ref 3.81–5.12)
RH BLD: POSITIVE
SODIUM SERPL-SCNC: 138 MMOL/L (ref 136–145)
SPECIMEN EXPIRATION DATE: NORMAL
WBC # BLD AUTO: 8.63 THOUSAND/UL (ref 4.31–10.16)

## 2018-09-07 PROCEDURE — 99285 EMERGENCY DEPT VISIT HI MDM: CPT

## 2018-09-07 PROCEDURE — 83690 ASSAY OF LIPASE: CPT

## 2018-09-07 PROCEDURE — 86900 BLOOD TYPING SEROLOGIC ABO: CPT | Performed by: EMERGENCY MEDICINE

## 2018-09-07 PROCEDURE — 85730 THROMBOPLASTIN TIME PARTIAL: CPT | Performed by: EMERGENCY MEDICINE

## 2018-09-07 PROCEDURE — 96375 TX/PRO/DX INJ NEW DRUG ADDON: CPT

## 2018-09-07 PROCEDURE — 74177 CT ABD & PELVIS W/CONTRAST: CPT

## 2018-09-07 PROCEDURE — 96374 THER/PROPH/DIAG INJ IV PUSH: CPT

## 2018-09-07 PROCEDURE — 86901 BLOOD TYPING SEROLOGIC RH(D): CPT | Performed by: EMERGENCY MEDICINE

## 2018-09-07 PROCEDURE — 80053 COMPREHEN METABOLIC PANEL: CPT

## 2018-09-07 PROCEDURE — 99223 1ST HOSP IP/OBS HIGH 75: CPT | Performed by: NURSE PRACTITIONER

## 2018-09-07 PROCEDURE — 36415 COLL VENOUS BLD VENIPUNCTURE: CPT

## 2018-09-07 PROCEDURE — 83605 ASSAY OF LACTIC ACID: CPT | Performed by: EMERGENCY MEDICINE

## 2018-09-07 PROCEDURE — 96361 HYDRATE IV INFUSION ADD-ON: CPT

## 2018-09-07 PROCEDURE — 85610 PROTHROMBIN TIME: CPT | Performed by: EMERGENCY MEDICINE

## 2018-09-07 PROCEDURE — 85025 COMPLETE CBC W/AUTO DIFF WBC: CPT

## 2018-09-07 PROCEDURE — 86850 RBC ANTIBODY SCREEN: CPT | Performed by: EMERGENCY MEDICINE

## 2018-09-07 RX ORDER — NICOTINE 21 MG/24HR
1 PATCH, TRANSDERMAL 24 HOURS TRANSDERMAL DAILY
Status: DISCONTINUED | OUTPATIENT
Start: 2018-09-08 | End: 2018-09-08 | Stop reason: HOSPADM

## 2018-09-07 RX ORDER — METRONIDAZOLE 500 MG/1
500 TABLET ORAL EVERY 8 HOURS SCHEDULED
Status: DISCONTINUED | OUTPATIENT
Start: 2018-09-07 | End: 2018-09-08 | Stop reason: HOSPADM

## 2018-09-07 RX ORDER — OXYCODONE HYDROCHLORIDE AND ACETAMINOPHEN 5; 325 MG/1; MG/1
1 TABLET ORAL EVERY 4 HOURS PRN
Status: DISCONTINUED | OUTPATIENT
Start: 2018-09-07 | End: 2018-09-08 | Stop reason: HOSPADM

## 2018-09-07 RX ORDER — ONDANSETRON 2 MG/ML
4 INJECTION INTRAMUSCULAR; INTRAVENOUS ONCE
Status: COMPLETED | OUTPATIENT
Start: 2018-09-07 | End: 2018-09-07

## 2018-09-07 RX ORDER — ONDANSETRON 2 MG/ML
4 INJECTION INTRAMUSCULAR; INTRAVENOUS EVERY 6 HOURS PRN
Status: DISCONTINUED | OUTPATIENT
Start: 2018-09-07 | End: 2018-09-08 | Stop reason: HOSPADM

## 2018-09-07 RX ORDER — SODIUM CHLORIDE 9 MG/ML
125 INJECTION, SOLUTION INTRAVENOUS CONTINUOUS
Status: DISCONTINUED | OUTPATIENT
Start: 2018-09-07 | End: 2018-09-08 | Stop reason: HOSPADM

## 2018-09-07 RX ADMIN — ONDANSETRON 4 MG: 2 INJECTION INTRAMUSCULAR; INTRAVENOUS at 20:29

## 2018-09-07 RX ADMIN — HYDROMORPHONE HYDROCHLORIDE 1 MG: 1 INJECTION, SOLUTION INTRAMUSCULAR; INTRAVENOUS; SUBCUTANEOUS at 22:43

## 2018-09-07 RX ADMIN — HYDROMORPHONE HYDROCHLORIDE 1 MG: 1 INJECTION, SOLUTION INTRAMUSCULAR; INTRAVENOUS; SUBCUTANEOUS at 21:19

## 2018-09-07 RX ADMIN — SODIUM CHLORIDE 1000 ML: 0.9 INJECTION, SOLUTION INTRAVENOUS at 21:19

## 2018-09-07 RX ADMIN — IOHEXOL 100 ML: 350 INJECTION, SOLUTION INTRAVENOUS at 21:28

## 2018-09-08 VITALS
RESPIRATION RATE: 18 BRPM | DIASTOLIC BLOOD PRESSURE: 58 MMHG | HEIGHT: 60 IN | BODY MASS INDEX: 34.28 KG/M2 | TEMPERATURE: 99 F | WEIGHT: 174.6 LBS | HEART RATE: 82 BPM | SYSTOLIC BLOOD PRESSURE: 115 MMHG | OXYGEN SATURATION: 95 %

## 2018-09-08 PROBLEM — F17.210 DEPENDENCE ON NICOTINE FROM CIGARETTES: Status: ACTIVE | Noted: 2018-09-08

## 2018-09-08 LAB
ANION GAP SERPL CALCULATED.3IONS-SCNC: 6 MMOL/L (ref 4–13)
BACTERIA UR QL AUTO: ABNORMAL /HPF
BASOPHILS # BLD AUTO: 0.04 THOUSANDS/ΜL (ref 0–0.1)
BASOPHILS NFR BLD AUTO: 1 % (ref 0–1)
BILIRUB UR QL STRIP: NEGATIVE
BUN SERPL-MCNC: 9 MG/DL (ref 5–25)
CALCIUM SERPL-MCNC: 8.3 MG/DL (ref 8.3–10.1)
CHLORIDE SERPL-SCNC: 106 MMOL/L (ref 100–108)
CLARITY UR: CLEAR
CO2 SERPL-SCNC: 26 MMOL/L (ref 21–32)
COLOR UR: YELLOW
CREAT SERPL-MCNC: 0.7 MG/DL (ref 0.6–1.3)
EOSINOPHIL # BLD AUTO: 0.14 THOUSAND/ΜL (ref 0–0.61)
EOSINOPHIL NFR BLD AUTO: 2 % (ref 0–6)
ERYTHROCYTE [DISTWIDTH] IN BLOOD BY AUTOMATED COUNT: 12.9 % (ref 11.6–15.1)
GFR SERPL CREATININE-BSD FRML MDRD: 105 ML/MIN/1.73SQ M
GLUCOSE SERPL-MCNC: 95 MG/DL (ref 65–140)
GLUCOSE UR STRIP-MCNC: NEGATIVE MG/DL
HCT VFR BLD AUTO: 39.1 % (ref 34.8–46.1)
HGB BLD-MCNC: 13.2 G/DL (ref 11.5–15.4)
HGB UR QL STRIP.AUTO: ABNORMAL
IMM GRANULOCYTES # BLD AUTO: 0.08 THOUSAND/UL (ref 0–0.2)
IMM GRANULOCYTES NFR BLD AUTO: 1 % (ref 0–2)
KETONES UR STRIP-MCNC: NEGATIVE MG/DL
LEUKOCYTE ESTERASE UR QL STRIP: NEGATIVE
LIPASE SERPL-CCNC: 90 U/L (ref 73–393)
LYMPHOCYTES # BLD AUTO: 2.12 THOUSANDS/ΜL (ref 0.6–4.47)
LYMPHOCYTES NFR BLD AUTO: 33 % (ref 14–44)
MAGNESIUM SERPL-MCNC: 1.8 MG/DL (ref 1.6–2.6)
MCH RBC QN AUTO: 30.3 PG (ref 26.8–34.3)
MCHC RBC AUTO-ENTMCNC: 33.8 G/DL (ref 31.4–37.4)
MCV RBC AUTO: 90 FL (ref 82–98)
MONOCYTES # BLD AUTO: 0.46 THOUSAND/ΜL (ref 0.17–1.22)
MONOCYTES NFR BLD AUTO: 7 % (ref 4–12)
NEUTROPHILS # BLD AUTO: 3.61 THOUSANDS/ΜL (ref 1.85–7.62)
NEUTS SEG NFR BLD AUTO: 56 % (ref 43–75)
NITRITE UR QL STRIP: NEGATIVE
NON-SQ EPI CELLS URNS QL MICRO: ABNORMAL /HPF
NRBC BLD AUTO-RTO: 0 /100 WBCS
PH UR STRIP.AUTO: 5.5 [PH] (ref 4.5–8)
PHOSPHATE SERPL-MCNC: 4.1 MG/DL (ref 2.7–4.5)
PLATELET # BLD AUTO: 235 THOUSANDS/UL (ref 149–390)
PMV BLD AUTO: 10.5 FL (ref 8.9–12.7)
POTASSIUM SERPL-SCNC: 3.8 MMOL/L (ref 3.5–5.3)
PROT UR STRIP-MCNC: NEGATIVE MG/DL
RBC # BLD AUTO: 4.35 MILLION/UL (ref 3.81–5.12)
RBC #/AREA URNS AUTO: ABNORMAL /HPF
SODIUM SERPL-SCNC: 138 MMOL/L (ref 136–145)
SP GR UR STRIP.AUTO: 1.02 (ref 1–1.03)
UROBILINOGEN UR QL STRIP.AUTO: 0.2 E.U./DL
WBC # BLD AUTO: 6.45 THOUSAND/UL (ref 4.31–10.16)
WBC #/AREA URNS AUTO: ABNORMAL /HPF

## 2018-09-08 PROCEDURE — 99232 SBSQ HOSP IP/OBS MODERATE 35: CPT | Performed by: GENERAL PRACTICE

## 2018-09-08 PROCEDURE — 99254 IP/OBS CNSLTJ NEW/EST MOD 60: CPT | Performed by: INTERNAL MEDICINE

## 2018-09-08 PROCEDURE — 85025 COMPLETE CBC W/AUTO DIFF WBC: CPT | Performed by: NURSE PRACTITIONER

## 2018-09-08 PROCEDURE — 80048 BASIC METABOLIC PNL TOTAL CA: CPT | Performed by: NURSE PRACTITIONER

## 2018-09-08 PROCEDURE — 84145 PROCALCITONIN (PCT): CPT | Performed by: GENERAL PRACTICE

## 2018-09-08 PROCEDURE — 81001 URINALYSIS AUTO W/SCOPE: CPT

## 2018-09-08 PROCEDURE — 83690 ASSAY OF LIPASE: CPT | Performed by: NURSE PRACTITIONER

## 2018-09-08 PROCEDURE — 83735 ASSAY OF MAGNESIUM: CPT | Performed by: NURSE PRACTITIONER

## 2018-09-08 PROCEDURE — 84100 ASSAY OF PHOSPHORUS: CPT | Performed by: NURSE PRACTITIONER

## 2018-09-08 RX ORDER — SENNOSIDES 8.6 MG
1 TABLET ORAL 2 TIMES DAILY
Status: DISCONTINUED | OUTPATIENT
Start: 2018-09-08 | End: 2018-09-08 | Stop reason: HOSPADM

## 2018-09-08 RX ORDER — POLYETHYLENE GLYCOL 3350 17 G/17G
17 POWDER, FOR SOLUTION ORAL DAILY
Qty: 14 EACH | Refills: 0 | Status: SHIPPED | OUTPATIENT
Start: 2018-09-09 | End: 2022-07-31

## 2018-09-08 RX ORDER — POLYETHYLENE GLYCOL 3350 17 G/17G
17 POWDER, FOR SOLUTION ORAL DAILY
Status: DISCONTINUED | OUTPATIENT
Start: 2018-09-08 | End: 2018-09-08 | Stop reason: HOSPADM

## 2018-09-08 RX ORDER — SENNOSIDES 8.6 MG
1 TABLET ORAL 2 TIMES DAILY
Qty: 120 EACH | Refills: 0 | Status: SHIPPED | OUTPATIENT
Start: 2018-09-09

## 2018-09-08 RX ADMIN — OXYCODONE HYDROCHLORIDE AND ACETAMINOPHEN 1 TABLET: 5; 325 TABLET ORAL at 10:57

## 2018-09-08 RX ADMIN — LEVOFLOXACIN 750 MG: 500 TABLET, FILM COATED ORAL at 00:17

## 2018-09-08 RX ADMIN — ONDANSETRON 4 MG: 2 INJECTION INTRAMUSCULAR; INTRAVENOUS at 05:13

## 2018-09-08 RX ADMIN — SODIUM CHLORIDE 125 ML/HR: 0.9 INJECTION, SOLUTION INTRAVENOUS at 17:06

## 2018-09-08 RX ADMIN — METRONIDAZOLE 500 MG: 500 TABLET ORAL at 14:52

## 2018-09-08 RX ADMIN — OXYCODONE HYDROCHLORIDE AND ACETAMINOPHEN 1 TABLET: 5; 325 TABLET ORAL at 05:11

## 2018-09-08 RX ADMIN — SENNOSIDES 8.6 MG: 8.6 TABLET, FILM COATED ORAL at 12:52

## 2018-09-08 RX ADMIN — SENNOSIDES 8.6 MG: 8.6 TABLET, FILM COATED ORAL at 17:08

## 2018-09-08 RX ADMIN — METRONIDAZOLE 500 MG: 500 TABLET ORAL at 00:17

## 2018-09-08 RX ADMIN — SODIUM CHLORIDE 125 ML/HR: 0.9 INJECTION, SOLUTION INTRAVENOUS at 08:27

## 2018-09-08 RX ADMIN — POLYETHYLENE GLYCOL 3350 17 G: 17 POWDER, FOR SOLUTION ORAL at 17:00

## 2018-09-08 RX ADMIN — HYDROMORPHONE HYDROCHLORIDE 0.5 MG: 1 INJECTION, SOLUTION INTRAMUSCULAR; INTRAVENOUS; SUBCUTANEOUS at 12:47

## 2018-09-08 RX ADMIN — METRONIDAZOLE 500 MG: 500 TABLET ORAL at 05:11

## 2018-09-08 RX ADMIN — SODIUM CHLORIDE 125 ML/HR: 0.9 INJECTION, SOLUTION INTRAVENOUS at 00:20

## 2018-09-08 RX ADMIN — OXYCODONE HYDROCHLORIDE AND ACETAMINOPHEN 1 TABLET: 5; 325 TABLET ORAL at 00:17

## 2018-09-08 RX ADMIN — HYDROMORPHONE HYDROCHLORIDE 1 MG: 1 INJECTION, SOLUTION INTRAMUSCULAR; INTRAVENOUS; SUBCUTANEOUS at 01:39

## 2018-09-08 NOTE — CASE MANAGEMENT
Initial Clinical Review    Admission: Date/Time/Statement: 9/7/18 @ 2242 INPATIENT    Orders Placed This Encounter   Procedures    Inpatient Admission (expected length of stay for this patient is greater than two midnights)     Standing Status:   Standing     Number of Occurrences:   1     Order Specific Question:   Admitting Physician     Answer:   Ye Valdez     Order Specific Question:   Level of Care     Answer:   Med Surg [16]     Order Specific Question:   Estimated length of stay     Answer:   More than 2 Midnights     Order Specific Question:   Certification     Answer:   I certify that inpatient services are medically necessary for this patient for a duration of greater than two midnights  See H&P and MD Progress Notes for additional information about the patient's course of treatment  ED: Date/Time/Mode of Arrival:   ED Arrival Information     Expected Arrival Acuity Means of Arrival Escorted By Service Admission Type    - 9/7/2018 19:50 Emergent [facility] Medicine Emergency    Arrival Complaint    flank pain        Chief Complaint:   Chief Complaint   Patient presents with    Flank Pain     Left lower flank pain with rectal bleeding-seen here last weekend for same  History of Illness:      Bhavik Pfeiffer is a 37 y o  female who presents with left lower quadrant pain onset 2 weeks ago  Also complaining of rectal bleed  Patient report was recently here treated for possible diverticulitis  Patient reports history of similar with sigmoidectomy  Patient reports chills  Patient report rectal pain with bloody discharge from rectum  Report chronic constipation has not moved bowels in 2 weeks    History of nicotine dependence        ED Vital Signs:   ED Triage Vitals [09/07/18 2001]   Temperature Pulse Respirations Blood Pressure SpO2   98 3 °F (36 8 °C) 96 18 138/80 98 %      Temp Source Heart Rate Source Patient Position - Orthostatic VS BP Location FiO2 (%)   Oral Monitor Sitting Left arm --      Pain Score       8        Wt Readings from Last 1 Encounters:   09/07/18 79 2 kg (174 lb 9 7 oz)       Vital Signs: WNL    Abnormal Labs/Diagnostic Test Results:     CT abdomen and pelvis:  Moderate amount of stool throughout the colon consistent with reported constipation  2   Nonobstructing 3 mm right renal calculus  No pyelonephritis  ED Treatment:   Medication Administration from 09/07/2018 1950 to 09/07/2018 2335       Date/Time Order Dose Route Action     09/07/2018 2029 ondansetron (ZOFRAN) injection 4 mg 4 mg Intravenous Given     09/07/2018 2119 sodium chloride 0 9 % bolus 1,000 mL 1,000 mL Intravenous New Bag     09/07/2018 2119 HYDROmorphone (DILAUDID) injection 1 mg 1 mg Intravenous Given     09/07/2018 2128 iohexol (OMNIPAQUE) 350 MG/ML injection (MULTI-DOSE) 100 mL 100 mL Intravenous Given     09/07/2018 2243 HYDROmorphone (DILAUDID) injection 1 mg 1 mg Intravenous Given          Past Medical/Surgical History: Active Ambulatory Problems     Diagnosis Date Noted    Zoster 10/18/2016    Headache 10/18/2016    Neuropathy 10/18/2016     Resolved Ambulatory Problems     Diagnosis Date Noted    No Resolved Ambulatory Problems     Past Medical History:   Diagnosis Date    Atrial septal defect     Mitral valve prolapse        Admitting Diagnosis: Diverticulosis [K57 90]  Rectal bleeding [K62 5]  Left lower quadrant pain [R10 32]  Constipation [K59 00]  Flank pain [R10 9]    Age/Sex: 37 y o  female    Assessment/Plan:     * Flank pain   Assessment & Plan     Patient we recently admitted, treatment was initiated for diverticulitis  In the setting of rectal bleed  CT reviewed, no acute infectious process noted  "No evidence of bowel obstruction, colitis or diverticulitis   Moderate amount of stool throughout the colon consistent with reported constipation "  NPO, sips of clears  Consult GI for further evaluation and management    Continue previously prescribed antibiotics, Levaquin and Flagyl  Pending GI evaluation  UA pending           Dependence on nicotine from cigarettes   Assessment & Plan     Smoking cessation  Nicotine patch offered           Rectal bleed   Assessment & Plan     Pre-hospital, onset about 1 week, tim red  CT reviewed, no acute colitis or diverticulitis noted, etiology at this time is unknown  NPO sips of clear liquids  GI consult  Monitor H&H  Comfort measures, IV fluids  Type and screen           Constipation   Assessment & Plan     Noted on CT, GI is consulted further evaluation and management  Will hold on bowel regiment pending GI consultation secondary to GI bleed  NPO clear liquids                 VTE Prophylaxis: Pharmacologic VTE Prophylaxis contraindicated due to Low risk, lower GI bleed  / reason for no mechanical VTE prophylaxis Ambulatory   Code Status:  Full code  POLST: POLST is not applicable to this patient  Discussion with family:      Anticipated Length of Stay:  Patient will be admitted on an Inpatient basis with an anticipated length of stay of  greater 2 midnights  Justification for Hospital Stay:  Rectal bleed, flank pain  Admission Orders:  Scheduled Meds:   Current Facility-Administered Medications:  HYDROmorphone 0 5 mg Intravenous Q4H PRN   levofloxacin 750 mg Oral Q24H   metroNIDAZOLE 500 mg Oral Q8H [address]   nicotine 1 patch Transdermal Daily   ondansetron 4 mg Intravenous Q6H PRN   oxyCODONE-acetaminophen 1 tablet Oral Q4H PRN   senna 1 tablet Oral BID     Continuous Infusions:   sodium chloride 125 mL/hr Last Rate: 125 mL/hr (09/08/18 0827)       Thank you,  [address]  Utilization Review Department  Phone: [phone]; Fax [phone]  ATTENTION: Please call with any questions or concerns to [phone]  and carefully follow the prompts so that you are directed to the right person     Send all requests for admission clinical reviews, approved or denied determinations and any other requests to fax [phone]   All voicemails are confidential

## 2018-09-08 NOTE — PROGRESS NOTES
[facility] Internal Medicine Progress Note  Patient: Todd Morataya 37 y o  female   MRN: [id number]  PCP: Abigail Steen DO  Unit/Bed#: MS Petr-01 Encounter: [id number]  Date Of Visit: 09/08/18    Assessment:    Principal Problem:    Flank pain  Active Problems:    Constipation    Rectal bleed    Dependence on nicotine from cigarettes      Plan:    * Flank pain   Assessment & Plan     Patient we recently admitted, treatment was initiated for diverticulitis  In the setting of rectal bleed  CT reviewed, no acute infectious process noted  "No evidence of bowel obstruction, colitis or diverticulitis   Moderate amount of stool throughout the colon consistent with reported constipation "  NPO, sips of clears  Consult GI for further evaluation and management  Continue previously prescribed antibiotics, Levaquin and Flagyl  Pending GI evaluation  UA reviewed  procalcitonin           Dependence on nicotine from cigarettes   Assessment & Plan     Smoking cessation  Nicotine patch offered           Rectal bleed   Assessment & Plan     Pre-hospital, onset about 1 week, flank red  CT reviewed, no acute colitis or diverticulitis noted, etiology at this time is unknown  NPO sips of clear liquids  GI consult  Monitor H&H   hgb 13          Constipation   Assessment & Plan     Noted on CT, GI is consulted further evaluation and management  Will hold on bowel regiment pending GI consultation secondary to GI bleed  NPO clear liquids                      VTE Pharmacologic Prophylaxis:   Pharmacologic: Pharmacologic VTE Prophylaxis contraindicated due to ambulatory  Mechanical VTE Prophylaxis in Place: Yes    Patient Centered Rounds: I have performed bedside rounds with nursing staff today  Discussions with Specialists or Other Care Team Provider:     Education and Discussions with Family / Patient:     Time Spent for Care: 30 minutes    More than 50% of total time spent on counseling and coordination of care as described above  Current Length of Stay: 1 day(s)    Current Patient Status: Inpatient   Certification Statement: The patient will continue to require additional inpatient hospital stay due to constipation    Discharge Plan: home    Code Status: Level 1 - Full Code      Subjective:   C/o continued abdominal dora    Objective:     Vitals:   Temp (24hrs), Av 9 °F (36 6 °C), Min:97 7 °F (36 5 °C), Max:98 3 °F (36 8 °C)    HR:  [84-97] 92  Resp:  [16-18] 16  BP: (105-138)/(56-80) 105/60  SpO2:  [94 %-98 %] 97 %  Body mass index is 34 1 kg/m²  Input and Output Summary (last 24 hours): Intake/Output Summary (Last 24 hours) at 18 1019  Last data filed at 18 0827   Gross per 24 hour   Intake             1799 ml   Output                0 ml   Net             1799 ml       Physical Exam:     Physical Exam   Constitutional: She is oriented to person, place, and time  She appears well-developed and well-nourished  HENT:   Head: Normocephalic and atraumatic  Eyes: Pupils are equal, round, and reactive to light  Cardiovascular: Normal rate and regular rhythm  Pulmonary/Chest: Effort normal and breath sounds normal    Abdominal: Soft  There is tenderness  Musculoskeletal: She exhibits no edema  Neurological: She is alert and oriented to person, place, and time         Additional Data:     Labs:      Results from last 7 days  Lab Units 18  0508   WBC Thousand/uL 6 45   HEMOGLOBIN g/dL 13 2   HEMATOCRIT % 39 1   PLATELETS Thousands/uL 235   NEUTROS PCT % 56   LYMPHS PCT % 33   MONOS PCT % 7   EOS PCT % 2       Results from last 7 days  Lab Units 18  0508 18   SODIUM mmol/L 138 138   POTASSIUM mmol/L 3 8 3 7   CHLORIDE mmol/L 106 103   CO2 mmol/L 26 27   BUN mg/dL 9 13   CREATININE mg/dL 0 70 0 76   CALCIUM mg/dL 8 3 9 0   ALK PHOS U/L  --  58   ALT U/L  --  38   AST U/L  --  21       Results from last 7 days  Lab Units 18   INR  0 98       * I Have Reviewed All Lab Data Listed Above  * Additional Pertinent Lab Tests Reviewed: All Labs Within Last 24 Hours Reviewed    Imaging:    Imaging Reports Reviewed Today Include:   Imaging Personally Reviewed by Myself Includes:      Recent Cultures (last 7 days):           Last 24 Hours Medication List:     Current Facility-Administered Medications:  levofloxacin 750 mg Oral Q24H GREG ConleyNP    metroNIDAZOLE 500 mg Oral Q8H [facility] RYLEY Conley    nicotine 1 patch Transdermal Daily RYLEY Conley    ondansetron 4 mg Intravenous Q6H PRN HerRYLEY Dickerson    oxyCODONE-acetaminophen 1 tablet Oral Q4H PRN HerRYLEY Dickerson    sodium chloride 125 mL/hr Intravenous Continuous RYLEY Conley Last Rate: 125 mL/hr (09/08/18 0827)        Today, Patient Was Seen By: Kanchan Benites MD    ** Please Note: Dragon 360 Dictation voice to text software may have been used in the creation of this document   **

## 2018-09-08 NOTE — ASSESSMENT & PLAN NOTE
Pre-hospital, onset about 1 week, flank red  CT reviewed, no acute colitis or diverticulitis noted, etiology at this time is unknown  NPO sips of clear liquids  GI consult  Monitor H&H  Comfort measures, IV fluids  Type and screen

## 2018-09-08 NOTE — H&P
H&P- Landrum Harada 1975, 37 y o  female MRN: [id number]    Unit/Bed#: -01 Encounter: [id number]    Primary Care Provider: Gerard Perez DO   Date and time admitted to hospital: 9/7/2018  8:03 PM        * Flank pain   Assessment & Plan    Patient we recently admitted, treatment was initiated for diverticulitis  In the setting of rectal bleed  CT reviewed, no acute infectious process noted  "No evidence of bowel obstruction, colitis or diverticulitis   Moderate amount of stool throughout the colon consistent with reported constipation "  NPO, sips of clears  Consult GI for further evaluation and management  Continue previously prescribed antibiotics, Levaquin and Flagyl  Pending GI evaluation  UA pending  Dependence on nicotine from cigarettes   Assessment & Plan    Smoking cessation  Nicotine patch offered  Rectal bleed   Assessment & Plan    Pre-hospital, onset about 1 week, flank red  CT reviewed, no acute colitis or diverticulitis noted, etiology at this time is unknown  NPO sips of clear liquids  GI consult  Monitor H&H  Comfort measures, IV fluids  Type and screen  Constipation   Assessment & Plan    Noted on CT, GI is consulted further evaluation and management  Will hold on bowel regiment pending GI consultation secondary to GI bleed  NPO clear liquids  VTE Prophylaxis: Pharmacologic VTE Prophylaxis contraindicated due to Low risk, lower GI bleed  / reason for no mechanical VTE prophylaxis Ambulatory   Code Status:  Full code  POLST: POLST is not applicable to this patient  Discussion with family:     Anticipated Length of Stay:  Patient will be admitted on an Inpatient basis with an anticipated length of stay of  greater 2 midnights  Justification for Hospital Stay:  Rectal bleed, flank pain      Total Time for Visit, including Counseling / Coordination of Care: 40   Greater than 50% of this total time spent on direct patient counseling and coordination of care  Chief Complaint:   Flank pain    History of Present Illness:    Nilda Orr is a 37 y o  female who presents with left lower quadrant pain onset 2 weeks ago  Also complaining of rectal bleed  Patient report was recently here treated for possible diverticulitis  Patient reports history of similar with sigmoidectomy  Patient reports chills  Denies fever, nausea, vomiting diarrhea  Patient report rectal pain with bloody discharge from rectum  Report chronic constipation has not moved bowels in 2 weeks  History of nicotine dependence  Review of Systems:    Review of Systems   Gastrointestinal: Positive for abdominal pain, blood in stool, constipation and nausea  Rectal bleed   All other systems reviewed and are negative  Past Medical and Surgical History:     Past Medical History:   Diagnosis Date    Atrial septal defect     Mitral valve prolapse        Past Surgical History:   Procedure Laterality Date    APPENDECTOMY       SECTION      CHOLECYSTECTOMY      HYSTERECTOMY      SIGMOID RESECTION / RECTOPEXY         Meds/Allergies:    Prior to Admission medications    Medication Sig Start Date End Date Taking?  Authorizing Provider   ALPRAZolam Antonia Knapp) 0 5 mg tablet Take 0 5 mg by mouth as needed for anxiety    Historical Provider, MD   HYDROcodone-acetaminophen (NORCO) 5-325 mg per tablet Take 1 tablet by mouth every 6 (six) hours as needed (Not relieved by Anti-inflammatory) for up to 12 doses Max Daily Amount: 4 tablets 18   Sandy Landon,    levofloxacin (LEVAQUIN) 750 mg tablet Take 1 tablet (750 mg total) by mouth every 24 hours for 10 days 18  Tana Bean DO   lidocaine viscous (XYLOCAINE) 2 % mucosal solution Apply small amount to the affected area TID PRN 18   William Shaw DO   metroNIDAZOLE (FLAGYL) 500 mg tablet Take 1 tablet (500 mg total) by mouth every 8 (eight) hours for 10 days 9/1/18 9/11/18  Palak Bean, DO   oxyCODONE-acetaminophen (PERCOCET) 5-325 mg per tablet Take 1-2 tablets by mouth every 4 (four) hours as needed for severe pain for up to 10 days Max Daily Amount: 12 tablets 9/1/18 9/11/18  Palak Bean, DO   pregabalin (LYRICA) 75 mg capsule Take 1 capsule by mouth 2 (two) times a day for 10 days 10/20/16 10/30/16  Vladislav Del Cid MD   valACYclovir (VALTREX) 1,000 mg tablet Take 1 tablet by mouth every 8 (eight) hours for 5 days 10/20/16 10/25/16  Vladislav Del Cid MD     I have reviewed home medications with patient personally  Allergies: No Known Allergies    Social History:     Marital Status: /Civil Union   Occupation:  Full time EMT  Patient Pre-hospital Living Situation:  Home  Patient Pre-hospital Level of Mobility:  Independent  Patient Pre-hospital Diet Restrictions:  None  Substance Use History:   History   Alcohol Use No     History   Smoking Status    Current Every Day Smoker    Packs/day: 0 50    Types: Cigarettes   Smokeless Tobacco    Never Used     History   Drug Use No       Family History:    non-contributory    Physical Exam:     Vitals:   Blood Pressure: 114/67 (09/07/18 2341)  Pulse: 87 (09/07/18 2341)  Temperature: 97 7 °F (36 5 °C) (09/07/18 2340)  Temp Source: Oral (09/07/18 2340)  Respirations: 18 (09/07/18 2341)  Height: 5' (152 4 cm) (09/07/18 2340)  Weight - Scale: 79 2 kg (174 lb 9 7 oz) (09/07/18 2340)  SpO2: 96 % (09/07/18 2341)    Physical Exam   Constitutional: She is oriented to person, place, and time  She appears well-developed and well-nourished  No distress  HENT:   Head: Normocephalic and atraumatic  Mouth/Throat: Oropharynx is clear and moist    Neck: Normal range of motion  Neck supple  Cardiovascular: Normal rate, regular rhythm, normal heart sounds and intact distal pulses  No murmur heard  Pulmonary/Chest: Effort normal and breath sounds normal  No accessory muscle usage  No respiratory distress   She has no rales  Abdominal: Soft  Bowel sounds are normal  She exhibits no distension  There is tenderness  Rectal bleed   Musculoskeletal: Normal range of motion  Neurological: She is alert and oriented to person, place, and time  Skin: Skin is warm and dry  She is not diaphoretic  Psychiatric: She has a normal mood and affect  Her behavior is normal    Nursing note and vitals reviewed  Additional Data:     Lab Results: I have personally reviewed pertinent reports  Results from last 7 days  Lab Units 09/07/18 2026   WBC Thousand/uL 8 63   HEMOGLOBIN g/dL 14 6   HEMATOCRIT % 43 1   PLATELETS Thousands/uL 287   NEUTROS PCT % 59   LYMPHS PCT % 29   MONOS PCT % 8   EOS PCT % 2       Results from last 7 days  Lab Units 09/07/18 2026   SODIUM mmol/L 138   POTASSIUM mmol/L 3 7   CHLORIDE mmol/L 103   CO2 mmol/L 27   BUN mg/dL 13   CREATININE mg/dL 0 76   CALCIUM mg/dL 9 0   ALK PHOS U/L 58   ALT U/L 38   AST U/L 21       Results from last 7 days  Lab Units 09/07/18 2026   INR  0 98               Imaging: I have personally reviewed pertinent reports  CT abdomen pelvis with contrast   Final Result by Johanna Lanier MD (09/07 2205)         1  No evidence of bowel obstruction, colitis or diverticulitis  Moderate amount of stool throughout the colon consistent with reported constipation  2   Nonobstructing 3 mm right renal calculus  No pyelonephritis  Workstation performed: [id number]             EKG, Pathology, and Other Studies Reviewed on Admission:   · EKG:     Allscripts / Epic Records Reviewed: Yes     ** Please Note: This note has been constructed using a voice recognition system   **

## 2018-09-08 NOTE — ASSESSMENT & PLAN NOTE
Noted on CT, GI is consulted further evaluation and management  Will hold on bowel regiment pending GI consultation secondary to GI bleed  NPO clear liquids

## 2018-09-08 NOTE — CONSULTS
Consultation - [facility]  Jaci Check 37 y o  female MRN: [id number]  Unit/Bed#: -01 Encounter: [id number]        Consults    Reason for Consult / Principal Problem:     Left lower quadrant pain, rectal bleeding, and constipation      ASSESSMENT AND PLAN:      Left lower quadrant pain:  It appears she was clinically doing nosed with acute diverticulitis and she does not have any CT evidence of acute diverticulitis on her two recent CT scans  At this point I would complete her course of antibiotics with metronidazole and levofloxacin  Rectal bleeding: This could be due to hemorrhoids, an anal fissure, or colitis  Since her last colonoscopy was about six years ago she should have this repeated electively as an outpatient  Constipation:  She has constipation and her last bowel movement was about two weeks ago  We will treat her with MiraLax  I will also start her on clear liquid diet  She would like to be discharged and this would be okay with me if she tolerates her clear liquid diet well today  She should advance her diet slowly and follow up with Gastroenterology as an outpatient     ______________________________________________________________________    HPI:  She is a 47-year-old woman who developed acute onset of left lower quadrant pain about two weeks ago  She said she was diagnosed with acute diverticulitis and started on antibiotics with levofloxacin and metronidazole  Her symptoms continued and she developed some rectal bleeding  She also developed worsening constipation and has not had a bowel movement now in about two weeks  She denies any weight loss, nausea, vomiting, and reflux  Because of her worsening symptoms she came to the emergency room for evaluation and here her CT scan revealed moderate constipation but did not show any evidence of diverticulitis    Similarly her CT scan about one week ago also did not reveal evidence of diverticulitis  She believes her last colonoscopy was about six years ago which was about one year before her sigmoid resection for acute diverticulitis five years ago  She denies any family history of colon polyps or colon cancer  REVIEW OF SYSTEMS:    CONSTITUTIONAL: Denies any fever, chills, rigors, and weight loss  HEENT: No earache or tinnitus  Denies hearing loss or visual disturbances  CARDIOVASCULAR: No chest pain or palpitations  RESPIRATORY: Denies any cough, hemoptysis, shortness of breath or dyspnea on exertion  GASTROINTESTINAL: As noted in the History of Present Illness  GENITOURINARY: No problems with urination  Denies any hematuria or dysuria, but complains of flank pain  NEUROLOGIC: No dizziness or vertigo, denies headaches  MUSCULOSKELETAL: Denies any muscle or joint pain  SKIN: Denies skin rashes or itching  ENDOCRINE: Denies excessive thirst  Denies intolerance to heat or cold  PSYCHOSOCIAL: Denies depression or anxiety  Denies any recent memory loss  Historical Information   Past Medical History:   Diagnosis Date    Atrial septal defect     Mitral valve prolapse      Past Surgical History:   Procedure Laterality Date    APPENDECTOMY       SECTION      CHOLECYSTECTOMY      HYSTERECTOMY      SIGMOID RESECTION / RECTOPEXY       Social History   History   Alcohol Use No     History   Drug Use No     History   Smoking Status    Current Every Day Smoker    Packs/day: 0 50    Types: Cigarettes   Smokeless Tobacco    Never Used     Family History   Problem Relation Age of Onset    Adopted:  Yes    No Known Problems Mother     No Known Problems Father        Meds/Allergies     Prescriptions Prior to Admission   Medication    ALPRAZolam (XANAX) 0 5 mg tablet    HYDROcodone-acetaminophen (NORCO) 5-325 mg per tablet    levofloxacin (LEVAQUIN) 750 mg tablet    lidocaine viscous (XYLOCAINE) 2 % mucosal solution    metroNIDAZOLE (FLAGYL) 500 mg tablet    oxyCODONE-acetaminophen (PERCOCET) 5-325 mg per tablet    pregabalin (LYRICA) 75 mg capsule    valACYclovir (VALTREX) 1,000 mg tablet     Current Facility-Administered Medications   Medication Dose Route Frequency    HYDROmorphone (DILAUDID) injection 0 5 mg  0 5 mg Intravenous Q4H PRN    levofloxacin (LEVAQUIN) tablet 750 mg  750 mg Oral Q24H    metroNIDAZOLE (FLAGYL) tablet 500 mg  500 mg Oral Q8H [address]    nicotine (NICODERM CQ) 14 mg/24hr TD 24 hr patch 1 patch  1 patch Transdermal Daily    ondansetron (ZOFRAN) injection 4 mg  4 mg Intravenous Q6H PRN    oxyCODONE-acetaminophen (PERCOCET) 5-325 mg per tablet 1 tablet  1 tablet Oral Q4H PRN    senna (SENOKOT) tablet 8 6 mg  1 tablet Oral BID    sodium chloride 0 9 % infusion  125 mL/hr Intravenous Continuous       No Known Allergies        Objective     Blood pressure 115/58, pulse 82, temperature 99 °F (37 2 °C), temperature source Oral, resp  rate 18, height 5' (1 524 m), weight 79 2 kg (174 lb 9 7 oz), SpO2 95 %  Body mass index is 34 1 kg/m²  Intake/Output Summary (Last 24 hours) at 09/08/18 1539  Last data filed at 09/08/18 0827   Gross per 24 hour   Intake             1799 ml   Output                0 ml   Net             1799 ml         PHYSICAL EXAM:      General Appearance:   Alert, cooperative, no distress   HEENT:   Normocephalic, atraumatic, anicteric      Neck:  Supple, symmetrical, trachea midline   Lungs:   Clear to auscultation bilaterally; no rales, rhonchi or wheezing; respirations unlabored    Heart[de-identified]   Regular rate and rhythm; no murmur, rub, or gallop     Abdomen:   Soft, LLQ tenderness, non-distended; normal bowel sounds; no masses, no organomegaly    Genitalia:   Deferred    Rectal:   Deferred    Extremities:  No cyanosis, clubbing or edema    Pulses:  2+ and symmetric all extremities    Skin:  No jaundice, rashes, or lesions    Lymph nodes:  No palpable cervical lymphadenopathy        Lab Results:   Admission on 09/07/2018 Component Date Value    WBC 09/07/2018 8 63     RBC 09/07/2018 4 81     Hemoglobin 09/07/2018 14 6     Hematocrit 09/07/2018 43 1     MCV 09/07/2018 90     MCH 09/07/2018 30 4     MCHC 09/07/2018 33 9     RDW 09/07/2018 13 1     MPV 09/07/2018 10 8     Platelets 20/52/9776 287     nRBC 09/07/2018 0     Neutrophils Relative 09/07/2018 59     Immat GRANS % 09/07/2018 1     Lymphocytes Relative 09/07/2018 29     Monocytes Relative 09/07/2018 8     Eosinophils Relative 09/07/2018 2     Basophils Relative 09/07/2018 1     Neutrophils Absolute 09/07/2018 5 20     Immature Grans Absolute 09/07/2018 0 05     Lymphocytes Absolute 09/07/2018 2 50     Monocytes Absolute 09/07/2018 0 68     Eosinophils Absolute 09/07/2018 0 15     Basophils Absolute 09/07/2018 0 05     Sodium 09/07/2018 138     Potassium 09/07/2018 3 7     Chloride 09/07/2018 103     CO2 09/07/2018 27     ANION GAP 09/07/2018 8     BUN 09/07/2018 13     Creatinine 09/07/2018 0 76     Glucose 09/07/2018 89     Calcium 09/07/2018 9 0     AST 09/07/2018 21     ALT 09/07/2018 38     Alkaline Phosphatase 09/07/2018 58     Total Protein 09/07/2018 7 6     Albumin 09/07/2018 4 0     Total Bilirubin 09/07/2018 0 20     eGFR 09/07/2018 95     Lipase 09/07/2018 92     Color, UA 09/08/2018 Yellow     Clarity, UA 09/08/2018 Clear     Specific Gravity, UA 09/08/2018 1 025     pH, UA 09/08/2018 5 5     Leukocytes, UA 09/08/2018 Negative     Nitrite, UA 09/08/2018 Negative     Protein, UA 09/08/2018 Negative     Glucose, UA 09/08/2018 Negative     Ketones, UA 09/08/2018 Negative     Urobilinogen, UA 09/08/2018 0 2     Bilirubin, UA 09/08/2018 Negative     Blood, UA 09/08/2018 Small*    Extra Tube 09/07/2018 Hold for add-ons       Protime 09/07/2018 12 9     INR 09/07/2018 0 98     PTT 09/07/2018 34     ABO Grouping 09/07/2018 O     Rh Factor 09/07/2018 Positive     Antibody Screen 09/07/2018 Negative     Specimen Expiration Date 09/07/2018 [id number]     LACTIC ACID 09/07/2018 1 4     Lipase 09/08/2018 90     Sodium 09/08/2018 138     Potassium 09/08/2018 3 8     Chloride 09/08/2018 106     CO2 09/08/2018 26     ANION GAP 09/08/2018 6     BUN 09/08/2018 9     Creatinine 09/08/2018 0 70     Glucose 09/08/2018 95     Calcium 09/08/2018 8 3     eGFR 09/08/2018 105     Magnesium 09/08/2018 1 8     Phosphorus 09/08/2018 4 1     WBC 09/08/2018 6 45     RBC 09/08/2018 4 35     Hemoglobin 09/08/2018 13 2     Hematocrit 09/08/2018 39 1     MCV 09/08/2018 90     MCH 09/08/2018 30 3     MCHC 09/08/2018 33 8     RDW 09/08/2018 12 9     MPV 09/08/2018 10 5     Platelets 73/68/6180 235     nRBC 09/08/2018 0     Neutrophils Relative 09/08/2018 56     Immat GRANS % 09/08/2018 1     Lymphocytes Relative 09/08/2018 33     Monocytes Relative 09/08/2018 7     Eosinophils Relative 09/08/2018 2     Basophils Relative 09/08/2018 1     Neutrophils Absolute 09/08/2018 3 61     Immature Grans Absolute 09/08/2018 0 08     Lymphocytes Absolute 09/08/2018 2 12     Monocytes Absolute 09/08/2018 0 46     Eosinophils Absolute 09/08/2018 0 14     Basophils Absolute 09/08/2018 0 04     RBC, UA 09/08/2018 2-4*    WBC, UA 09/08/2018 None Seen     Epithelial Cells 09/08/2018 Occasional     Bacteria, UA 09/08/2018 None Seen        Imaging Studies: I have personally reviewed pertinent imaging studies

## 2018-09-08 NOTE — ED PROVIDER NOTES
History  Chief Complaint   Patient presents with    Flank Pain     Left lower flank pain with rectal bleeding-seen here last weekend for same  Patient is a 40-year-old female with past medical and surgical history significant for diverticulosis, recurrent diverticulitis status post sigmoidectomy, atrial septal defect, mitral valve prolapse, appendectomy, cholecystectomy, C-sections, hysterectomy, presents to the emergency department complaining of left lower quadrant abdominal pain for the past 2 weeks, getting progressively worse as well as 1 week of rectal bleeding  Patient reports that about 2 weeks ago she started feeling unwell  She was having lower abdominal cramping and poor appetite  She put herself on a bland diet however her pain only increased and started to localized to the left lower abdomen  She also developed rectal bleeding and for the past 1 week she has had persistent blood per rectum  She also has been constipated and has not had a bowel movement in 2 weeks  She states every time she uses the bathroom to void, she bleeds rectally  She has been going through multiple pads per day  She has tried taking stool softeners over 1 week ago without any relief  She was seen in the ED 1 week ago, had a CT scan at that time which showed diverticulosis and a nonobstructing left kidney stone  She was placed on Levaquin and Flagyl which she continues to take  She is also given oxycodone however due to her job she has been only taking that at night  She has felt nauseated but denies any episodes of vomiting  She states she has had low-grade fevers with T-max of 100 6°  She has had poor appetite  Patient also reports she feels generally weak and fatigued    She denies any headache, dizziness or near syncope, URI symptoms, cough, hemoptysis, chest pain, shortness of breath, palpitations, visual disturbance or eye pain, vomiting, melena, dysuria, change in urinary frequency, hematuria, flank pain, skin rash or color change, lateralizing extremity weakness or paresthesia or other focal neurologic deficits  She denies being on any blood thinners  History provided by:  Patient   used: No        Prior to Admission Medications   Prescriptions Last Dose Informant Patient Reported? Taking? ALPRAZolam (XANAX) 0 5 mg tablet   Yes No   Sig: Take 0 5 mg by mouth 2 (two) times a day as needed for anxiety     HYDROcodone-acetaminophen (NORCO) 5-325 mg per tablet   No No   Sig: Take 1 tablet by mouth every 6 (six) hours as needed (Not relieved by Anti-inflammatory) for up to 12 doses Max Daily Amount: 4 tablets   levofloxacin (LEVAQUIN) 750 mg tablet   No No   Sig: Take 1 tablet (750 mg total) by mouth every 24 hours for 10 days   lidocaine viscous (XYLOCAINE) 2 % mucosal solution   No No   Sig: Apply small amount to the affected area TID PRN   metroNIDAZOLE (FLAGYL) 500 mg tablet   No No   Sig: Take 1 tablet (500 mg total) by mouth every 8 (eight) hours for 10 days   oxyCODONE-acetaminophen (PERCOCET) 5-325 mg per tablet   No No   Sig: Take 1-2 tablets by mouth every 4 (four) hours as needed for severe pain for up to 10 days Max Daily Amount: 12 tablets   pregabalin (LYRICA) 75 mg capsule   No No   Sig: Take 1 capsule by mouth 2 (two) times a day for 10 days   valACYclovir (VALTREX) 1,000 mg tablet   No No   Sig: Take 1 tablet by mouth every 8 (eight) hours for 5 days      Facility-Administered Medications: None       Past Medical History:   Diagnosis Date    Atrial septal defect     Mitral valve prolapse        Past Surgical History:   Procedure Laterality Date    APPENDECTOMY       SECTION      CHOLECYSTECTOMY      HYSTERECTOMY      SIGMOID RESECTION / RECTOPEXY         Family History   Problem Relation Age of Onset    Adopted: Yes    No Known Problems Mother     No Known Problems Father      I have reviewed and agree with the history as documented      Social History Substance Use Topics    Smoking status: Current Every Day Smoker     Packs/day: 0 50     Types: Cigarettes    Smokeless tobacco: Never Used    Alcohol use No        Review of Systems   Constitutional: Positive for appetite change, fatigue and fever  Negative for chills  HENT: Negative for congestion, ear pain, rhinorrhea and sore throat  Eyes: Negative for pain and visual disturbance  Respiratory: Negative for cough, chest tightness, shortness of breath and wheezing  Cardiovascular: Negative for chest pain and palpitations  Gastrointestinal: Positive for abdominal distention, abdominal pain, blood in stool, constipation, nausea and rectal pain  Negative for diarrhea and vomiting  Genitourinary: Negative for dysuria, flank pain, frequency, hematuria and vaginal discharge  Musculoskeletal: Negative for back pain and neck pain  Skin: Negative for color change, pallor and rash  Allergic/Immunologic: Negative for immunocompromised state  Neurological: Positive for weakness  Negative for dizziness, syncope, light-headedness, numbness and headaches  Hematological: Negative for adenopathy  Does not bruise/bleed easily  Psychiatric/Behavioral: Negative for confusion and decreased concentration  All other systems reviewed and are negative  Physical Exam  Physical Exam   Constitutional: She is oriented to person, place, and time  She appears well-developed and well-nourished  No distress  HENT:   Head: Normocephalic and atraumatic  Mouth/Throat: Oropharynx is clear and moist  No oropharyngeal exudate  Eyes: Conjunctivae and EOM are normal  Pupils are equal, round, and reactive to light  Neck: Normal range of motion  Neck supple  No JVD present  Cardiovascular: Normal rate, regular rhythm, normal heart sounds and intact distal pulses  Exam reveals no gallop and no friction rub  No murmur heard  Pulmonary/Chest: Effort normal and breath sounds normal  No respiratory distress  She has no wheezes  She has no rales  Abdominal: Soft  Bowel sounds are normal  She exhibits no distension  There is tenderness  There is no rebound and no guarding  Significant tenderness in the left lower quadrant  No CVA tenderness  Genitourinary:   Genitourinary Comments: Stool grossly bloody, bright red  Guaiac positive  Musculoskeletal: Normal range of motion  She exhibits no edema or tenderness  Neurological: She is alert and oriented to person, place, and time  No gross motor or sensory deficits  Skin: Skin is warm and dry  No rash noted  She is not diaphoretic  No erythema  No pallor  Psychiatric: She has a normal mood and affect  Her behavior is normal    Nursing note and vitals reviewed        Vital Signs  ED Triage Vitals [09/07/18 2001]   Temperature Pulse Respirations Blood Pressure SpO2   98 3 °F (36 8 °C) 96 18 138/80 98 %      Temp Source Heart Rate Source Patient Position - Orthostatic VS BP Location FiO2 (%)   Oral Monitor Sitting Left arm --      Pain Score       8         Vitals:    09/07/18 2300 09/07/18 2339 09/07/18 2340 09/07/18 2341   BP: 113/58 106/57 128/65 114/67   BP Location:  Left arm Left arm Left arm   Pulse: 90 84 89 87   Resp:  18 18 18   Temp:   97 7 °F (36 5 °C)    TempSrc:   Oral    SpO2: 94% 95% 96% 96%   Weight:   79 2 kg (174 lb 9 7 oz)    Height:   5' (1 524 m)      Visual Acuity      ED Medications  Medications   levofloxacin (LEVAQUIN) tablet 750 mg (750 mg Oral Given 9/8/18 0017)   metroNIDAZOLE (FLAGYL) tablet 500 mg (500 mg Oral Given 9/8/18 0017)   oxyCODONE-acetaminophen (PERCOCET) 5-325 mg per tablet 1 tablet (1 tablet Oral Given 9/8/18 0017)   sodium chloride 0 9 % infusion (125 mL/hr Intravenous New Bag 9/8/18 0020)   ondansetron (ZOFRAN) injection 4 mg (not administered)   nicotine (NICODERM CQ) 14 mg/24hr TD 24 hr patch 1 patch (not administered)   HYDROmorphone (DILAUDID) injection 1 mg (not administered)   ondansetron (ZOFRAN) injection 4 mg (4 mg Intravenous Given 9/7/18 2029)   sodium chloride 0 9 % bolus 1,000 mL (0 mL Intravenous Stopped 9/8/18 0021)   HYDROmorphone (DILAUDID) injection 1 mg (1 mg Intravenous Given 9/7/18 2119)   iohexol (OMNIPAQUE) 350 MG/ML injection (MULTI-DOSE) 100 mL (100 mL Intravenous Given 9/7/18 2128)   HYDROmorphone (DILAUDID) injection 1 mg (1 mg Intravenous Given 9/7/18 2243)       Diagnostic Studies  Results Reviewed     Procedure Component Value Units Date/Time    [place] draw [[id number]] Collected:  09/07/18 2026    Lab Status:  Final result Specimen:  Blood Updated:  09/07/18 2201    Narrative: The following orders were created for panel order [place] draw  Procedure                               Abnormality         Status                     ---------                               -----------         ------                     Mohinder Rebel Top on WMLY[[id number]]                            Final result               Green / Black tube on TGQS[[id number]]                        Final result                 Please view results for these tests on the individual orders  Lactic acid, plasma [[id number]]  (Normal) Collected:  09/07/18 2115    Lab Status:  Final result Specimen:  Blood from Arm, Right Updated:  09/07/18 2150     LACTIC ACID 1 4 mmol/L     Narrative:         Result may be elevated if tourniquet was used during collection      Protime-INR [[id number]]  (Normal) Collected:  09/07/18 2026    Lab Status:  Final result Specimen:  Blood from Arm, Right Updated:  09/07/18 2123     Protime 12 9 seconds      INR 0 98    APTT [[id number]]  (Normal) Collected:  09/07/18 2026    Lab Status:  Final result Specimen:  Blood from Arm, Right Updated:  09/07/18 2123     PTT 34 seconds     Comprehensive metabolic panel [[id number]] Collected:  09/07/18 2026    Lab Status:  Final result Specimen:  Blood from Arm, Right Updated:  09/07/18 2053     Sodium 138 mmol/L      Potassium 3 7 mmol/L      Chloride 103 mmol/L      CO2 27 mmol/L ANION GAP 8 mmol/L      BUN 13 mg/dL      Creatinine 0 76 mg/dL      Glucose 89 mg/dL      Calcium 9 0 mg/dL      AST 21 U/L      ALT 38 U/L      Alkaline Phosphatase 58 U/L      Total Protein 7 6 g/dL      Albumin 4 0 g/dL      Total Bilirubin 0 20 mg/dL      eGFR 95 ml/min/1 73sq m     Narrative:         National Kidney Disease Education Program recommendations are as follows:  GFR calculation is accurate only with a steady state creatinine  Chronic Kidney disease less than 60 ml/min/1 73 sq  meters  Kidney failure less than 15 ml/min/1 73 sq  meters  Lipase [[id number]]  (Normal) Collected:  09/07/18 2026    Lab Status:  Final result Specimen:  Blood from Arm, Right Updated:  09/07/18 2053     Lipase 92 u/L     CBC and differential [[id number]] Collected:  09/07/18 2026    Lab Status:  Final result Specimen:  Blood from Arm, Right Updated:  09/07/18 2034     WBC 8 63 Thousand/uL      RBC 4 81 Million/uL      Hemoglobin 14 6 g/dL      Hematocrit 43 1 %      MCV 90 fL      MCH 30 4 pg      MCHC 33 9 g/dL      RDW 13 1 %      MPV 10 8 fL      Platelets 803 Thousands/uL      nRBC 0 /100 WBCs      Neutrophils Relative 59 %      Immat GRANS % 1 %      Lymphocytes Relative 29 %      Monocytes Relative 8 %      Eosinophils Relative 2 %      Basophils Relative 1 %      Neutrophils Absolute 5 20 Thousands/µL      Immature Grans Absolute 0 05 Thousand/uL      Lymphocytes Absolute 2 50 Thousands/µL      Monocytes Absolute 0 68 Thousand/µL      Eosinophils Absolute 0 15 Thousand/µL      Basophils Absolute 0 05 Thousands/µL     UA w Reflex to Microscopic w Reflex to Culture [[id number]]     Lab Status:  No result Specimen:  Urine                  CT abdomen pelvis with contrast   Final Result by Margarito Chao MD (09/07 2205)         1  No evidence of bowel obstruction, colitis or diverticulitis  Moderate amount of stool throughout the colon consistent with reported constipation     2   Nonobstructing 3 mm right renal calculus  No pyelonephritis  Workstation performed: [id number]                    Procedures  Procedures       Phone Contacts  ED Phone Contact    ED Course  ED Course as of Sep 08 0139   [facility] Sep 07, 2018   2132 Hemoglobin: 14 6   2238 Patient reassessed and still having a great deal of pain  Will re-dose Dilaudid  Updated her the blood work is unremarkable and hemoglobin is stable  CT scan shows constipation but no evidence of diverticulitis  She does report that in the past, she has had diverticulitis without CT evidence of such and it was found on exploratory laparoscopy  At this point given significant rectal bleeding, worsening pain and constipation, will admit for GI consultation  and possible colonoscopy  Patient agreeable  MDM  Number of Diagnoses or Management Options  Diagnosis management comments: 75-year-old female presents with 1 week of persistent rectal bleeding as well as constipation and left lower quadrant abdominal pain  Differential includes diverticulosis bleed, AVM, colonic mass, hemorrhoid, diverticulitis, bowel obstruction, bowel perforation  Will check abdominal labs, lactic acid, UA, and CT scan of the abdomen and pelvis  Will also check type and screening and coags  Will give Dilaudid, Zofran and IV fluid bolus         Amount and/or Complexity of Data Reviewed  Clinical lab tests: ordered and reviewed  Tests in the radiology section of CPT®: ordered and reviewed  Review and summarize past medical records: yes  Independent visualization of images, tracings, or specimens: yes      CritCare Time    Disposition  Final diagnoses:   Rectal bleeding   Constipation   Left lower quadrant pain   Diverticulosis     Time reflects when diagnosis was documented in both MDM as applicable and the Disposition within this note     Time User Action Codes Description Comment    9/7/2018 10:41 PM Kenya FLANNERY Add [K62 5] Rectal bleeding     9/7/2018 10:41 PM Gwen Shannon Add [K59 00] Constipation     9/7/2018 10:41 PM Boundaryalina Rodartekana FLANNERY Add [R10 32] Left lower quadrant pain     9/7/2018 10:41 PM Boundary Donna FLANNERY Add [K57 90] Diverticulosis       ED Disposition     ED Disposition Condition Comment    Admit  Case was discussed with LOAN and the patient's admission status was agreed to be Admission Status: inpatient status to the service of Dr Jenni Ramos           Follow-up Information    None         Current Discharge Medication List      CONTINUE these medications which have NOT CHANGED    Details   ALPRAZolam (XANAX) 0 5 mg tablet Take 0 5 mg by mouth 2 (two) times a day as needed for anxiety        HYDROcodone-acetaminophen (NORCO) 5-325 mg per tablet Take 1 tablet by mouth every 6 (six) hours as needed (Not relieved by Anti-inflammatory) for up to 12 doses Max Daily Amount: 4 tablets  Qty: 12 tablet, Refills: 0    Associated Diagnoses: Closed head injury, initial encounter; Dental abscess      levofloxacin (LEVAQUIN) 750 mg tablet Take 1 tablet (750 mg total) by mouth every 24 hours for 10 days  Qty: 10 tablet, Refills: 0    Associated Diagnoses: Diverticulitis      lidocaine viscous (XYLOCAINE) 2 % mucosal solution Apply small amount to the affected area TID PRN  Qty: 15 mL, Refills: 0    Associated Diagnoses: Abdominal pain      metroNIDAZOLE (FLAGYL) 500 mg tablet Take 1 tablet (500 mg total) by mouth every 8 (eight) hours for 10 days  Qty: 30 tablet, Refills: 0    Associated Diagnoses: Diverticulitis      oxyCODONE-acetaminophen (PERCOCET) 5-325 mg per tablet Take 1-2 tablets by mouth every 4 (four) hours as needed for severe pain for up to 10 days Max Daily Amount: 12 tablets  Qty: 20 tablet, Refills: 0    Associated Diagnoses: Abdominal pain      pregabalin (LYRICA) 75 mg capsule Take 1 capsule by mouth 2 (two) times a day for 10 days  Qty: 20 capsule, Refills: 0      valACYclovir (VALTREX) 1,000 mg tablet Take 1 tablet by mouth every 8 (eight) hours for 5 days  Qty: 15 tablet, Refills: 0           No discharge procedures on file      ED Provider  Electronically Signed by           Rema Canela DO  09/08/18 2288

## 2018-09-08 NOTE — ASSESSMENT & PLAN NOTE
Patient we recently admitted, treatment was initiated for diverticulitis  In the setting of rectal bleed  CT reviewed, no acute infectious process noted  "No evidence of bowel obstruction, colitis or diverticulitis   Moderate amount of stool throughout the colon consistent with reported constipation "  NPO, sips of clears  Consult GI for further evaluation and management  Continue previously prescribed antibiotics, Levaquin and Flagyl  Pending GI evaluation  UA pending

## 2018-09-09 LAB — PROCALCITONIN SERPL-MCNC: <0.05 NG/ML

## 2018-09-10 NOTE — CASE MANAGEMENT
Karyle Hermanns, RN Registered Nurse Addendum   Case Management Date of Service: 9/8/2018  2:41 PM         []Hide copied text  Initial Clinical Review     Admission: Date/Time/Statement: 9/7/18 @ 2242 INPATIENT           Orders Placed This Encounter   Procedures    Inpatient Admission (expected length of stay for this patient is greater than two midnights)       Standing Status:   Standing       Number of Occurrences:   1       Order Specific Question:   Admitting Physician       Answer:   Heather Jones [[id number]]       Order Specific Question:   Level of Care       Answer:   Med Surg [[id number]]       Order Specific Question:   Estimated length of stay       Answer:   More than 2 Midnights       Order Specific Question:   Certification       Answer:   I certify that inpatient services are medically necessary for this patient for a duration of greater than two midnights  See H&P and MD Progress Notes for additional information about the patient's course of treatment       ED: Date/Time/Mode of Arrival:             ED Arrival Information      Expected Arrival Acuity Means of Arrival Escorted By Service Admission Type     - 9/7/2018 19:50 Emergent [facility] Emergency     Arrival Complaint     flank pain          Chief Complaint:        Chief Complaint   Patient presents with    Flank Pain       Left lower flank pain with rectal bleeding-seen here last weekend for same          History of Illness:       Cora Cervantes a 37 y  o  female who presents with left lower quadrant pain onset 2 weeks ago  Taylor Bracket complaining of rectal bleed  Patient report was recently here treated for possible diverticulitis   Patient reports history of similar with sigmoidectomy   Patient reports chills  Patient report rectal pain with bloody discharge from rectum   Report chronic constipation has not moved bowels in 2 weeks   History of nicotine dependence         ED Vital Signs:            ED Triage Vitals [09/07/18 2001]   Temperature Pulse Respirations Blood Pressure SpO2   98 3 °F (36 8 °C) 96 18 138/80 98 %       Temp Source Heart Rate Source Patient Position - Orthostatic VS BP Location FiO2 (%)   Oral Monitor Sitting Left arm --       Pain Score           8                Wt Readings from Last 1 Encounters:   09/07/18 79 2 kg (174 lb 9 7 oz)         Vital Signs: WNL     Abnormal Labs/Diagnostic Test Results:      CT abdomen and pelvis:  Moderate amount of stool throughout the colon consistent with reported constipation  2   Nonobstructing 3 mm right renal calculus   No pyelonephritis      ED Treatment:             Medication Administration from 09/07/2018 1950 to 09/07/2018 2335        Date/Time Order Dose Route Action       09/07/2018 2029 ondansetron (ZOFRAN) injection 4 mg 4 mg Intravenous Given       09/07/2018 2119 sodium chloride 0 9 % bolus 1,000 mL 1,000 mL Intravenous New Bag       09/07/2018 2119 HYDROmorphone (DILAUDID) injection 1 mg 1 mg Intravenous Given       09/07/2018 2128 iohexol (OMNIPAQUE) 350 MG/ML injection (MULTI-DOSE) 100 mL 100 mL Intravenous Given       09/07/2018 2243 HYDROmorphone (DILAUDID) injection 1 mg 1 mg Intravenous Given             Past Medical/Surgical History: Active Ambulatory Problems     Diagnosis Date Noted    Zoster 10/18/2016    Headache 10/18/2016    Neuropathy 10/18/2016           Resolved Ambulatory Problems     Diagnosis Date Noted    No Resolved Ambulatory Problems           Past Medical History:   Diagnosis Date    Atrial septal defect      Mitral valve prolapse           Admitting Diagnosis: Diverticulosis [K57 90]  Rectal bleeding [K62 5]  Left lower quadrant pain [R10 32]  Constipation [K59 00]  Flank pain [R10 9]     Age/Sex: 37 y o  female     Assessment/Plan:          * Flank pain   Assessment & Plan     Patient we recently admitted, treatment was initiated for diverticulitis   In the setting of rectal bleed    CT reviewed, no acute infectious process noted  "No evidence of bowel obstruction, colitis or diverticulitis   Moderate amount of stool throughout the colon consistent with reported constipation "  NPO, sips of clears  Consult GI for further evaluation and management  Continue previously prescribed antibiotics, Levaquin and Flagyl   Pending GI evaluation  UA pending           Dependence on nicotine from cigarettes   Assessment & Plan     Smoking cessation  Nicotine patch offered           Rectal bleed   Assessment & Plan     Pre-hospital, onset about 1 week, tim red  CT reviewed, no acute colitis or diverticulitis noted, etiology at this time is unknown  NPO sips of clear liquids  GI consult  Monitor H&H  Comfort measures, IV fluids  Type and screen           Constipation   Assessment & Plan     Noted on CT, GI is consulted further evaluation and management  Will hold on bowel regiment pending GI consultation secondary to GI bleed    NPO clear liquids                 VTE Prophylaxis: Pharmacologic VTE Prophylaxis contraindicated due to Low risk, lower GI bleed  / reason for no mechanical VTE prophylaxis Ambulatory   Code Status:  Full code  POLST: POLST is not applicable to this patient  Discussion with family:      Anticipated Length of Stay: Claude Gums will be admitted on an Inpatient basis with an anticipated length of stay of  greater 2 midnights    Justification for Hospital Stay:  Rectal bleed, flank pain         Admission Orders:  Scheduled Meds:   Current Facility-Administered Medications:  HYDROmorphone 0 5 mg Intravenous Q4H PRN   levofloxacin 750 mg Oral Q24H   metroNIDAZOLE 500 mg Oral Q8H [address]   nicotine 1 patch Transdermal Daily   ondansetron 4 mg Intravenous Q6H PRN   oxyCODONE-acetaminophen 1 tablet Oral Q4H PRN   senna 1 tablet Oral BID      Continuous Infusions:   sodium chloride 125 mL/hr Last Rate: 125 mL/hr (09/08/18 1348)         Thank you,  [address] Utilization Review Department  Phone: [phone]; Fax [phone]  ATTENTION: Please call with any questions or concerns to [phone]  and carefully follow the prompts so that you are directed to the right person  Send all requests for admission clinical reviews, approved or denied determinations and any other requests to fax [phone]   All voicemails are confidential

## 2018-10-18 ENCOUNTER — OFFICE VISIT (OUTPATIENT)
Dept: GASTROENTEROLOGY | Facility: CLINIC | Age: 43
End: 2018-10-18
Payer: COMMERCIAL

## 2018-10-18 VITALS
SYSTOLIC BLOOD PRESSURE: 124 MMHG | HEART RATE: 109 BPM | BODY MASS INDEX: 40.48 KG/M2 | DIASTOLIC BLOOD PRESSURE: 72 MMHG | WEIGHT: 207.25 LBS

## 2018-10-18 DIAGNOSIS — K57.92 DIVERTICULITIS: Primary | ICD-10-CM

## 2018-10-18 PROCEDURE — 99204 OFFICE O/P NEW MOD 45 MIN: CPT | Performed by: INTERNAL MEDICINE

## 2018-10-18 RX ORDER — DICYCLOMINE HCL 20 MG
20 TABLET ORAL 3 TIMES DAILY PRN
Qty: 60 TABLET | Refills: 1 | Status: SHIPPED | OUTPATIENT
Start: 2018-10-18 | End: 2022-07-28

## 2018-10-18 NOTE — PROGRESS NOTES
Consultation - [facility]) [facility]  Sascha Ordaz 1975 37 y o  female     ASSESSMENT @ PLAN:   She is a 40-year-old female with recurrent diverticulitis over her lifetime with a sigmoid resection and further episodes of left lower quadrant abdominal pain requiring antibiotics and bowel rest   In addition she will during her hospitalization she had a CT scan that showed no acute diverticulitis and she had rectal bleeding; her last colonoscopy was in 2012    1 will do colonoscopy to investigate    2 I gave her dicyclomine and I told her if she feels an onset of symptoms she could be treated conservatively at home at [facility] with clear liquid diet and antispasmodic therapy    3 I told her that she can reduce her further episodes with no smoking, diet weight loss, exercise more than 2 hr a week, high-fiber more than 25 g a day, red meat less than 4 times a week    Chief Complaint:   Diverticulitis    HPI:   She is a 40-year-old female with hospitalizations at [facility] with diverticulitis  She reports that the she was receiving oral antibiotics and she went home and she had no improvement she was admitted with IV antibiotics  She had a CT scan that showed no acute diverticulitis  Her last colonoscopy was in 2012  She had rectal bleeding at that time that was felt to be due to hemorrhoids  She reports in 2004 she started to have multiple episodes of left lower quadrant abdominal pain with diverticulitis  She had a normal colonoscopy  She had multiple episodes over 4 years  She was diagnosed with recurrent diverticulitis and had an exploratory laparotomy surgery in 2008 and had a sigmoid resection  She then had multiple further episodes of diverticulitis  She was well for about for 5 years before this new episode  Nobody in the family has Crohn's disease or ulcerative colitis  She has had colonoscopies and has not had polyps on the before      REVIEW OF SYSTEMS:     CONSTITUTIONAL: Denies any fever, chills, or rigors  Good appetite, and no recent weight loss  HEENT: No earache or tinnitus  Denies hearing loss or visual disturbances  CARDIOVASCULAR: No chest pain or palpitations  RESPIRATORY: Denies any cough, hemoptysis, shortness of breath or dyspnea on exertion  GASTROINTESTINAL: As noted in the History of Present Illness  GENITOURINARY: No problems with urination  Denies any hematuria or dysuria  NEUROLOGIC: No dizziness or vertigo, denies headaches  MUSCULOSKELETAL: Denies any muscle or joint pain  SKIN: Denies skin rashes or itching  ENDOCRINE: Denies excessive thirst  Denies intolerance to heat or cold  PSYCHOSOCIAL: Denies depression or anxiety  Denies any recent memory loss  Past Medical History:   Diagnosis Date    Atrial septal defect     Diverticulitis of colon     Mitral valve prolapse       Past Surgical History:   Procedure Laterality Date    APPENDECTOMY       SECTION      CHOLECYSTECTOMY      HYSTERECTOMY      SIGMOID RESECTION / RECTOPEXY       Social History     Social History    Marital status: /Civil Union     Spouse name: N/A    Number of children: N/A    Years of education: N/A     Occupational History    Not on file  Social History Main Topics    Smoking status: Current Every Day Smoker     Packs/day: 0 50     Types: Cigarettes    Smokeless tobacco: Never Used    Alcohol use No    Drug use: No    Sexual activity: Not on file     Other Topics Concern    Not on file     Social History Narrative    No narrative on file     Family History   Problem Relation Age of Onset    Adopted: Yes    No Known Problems Mother     No Known Problems Father      Patient has no known allergies    Current Outpatient Prescriptions   Medication Sig Dispense Refill    ALPRAZolam (XANAX) 0 5 mg tablet Take 0 5 mg by mouth daily as needed for anxiety        dicyclomine (BENTYL) 20 mg tablet Take 1 tablet (20 mg total) by mouth 3 (three) times a day as needed (abdominal pain) 60 tablet 1    HYDROcodone-acetaminophen (NORCO) 5-325 mg per tablet Take 1 tablet by mouth every 6 (six) hours as needed (Not relieved by Anti-inflammatory) for up to 12 doses Max Daily Amount: 4 tablets (Patient not taking: Reported on 10/18/2018 ) 12 tablet 0    lidocaine viscous (XYLOCAINE) 2 % mucosal solution Apply small amount to the affected area TID PRN (Patient not taking: Reported on 10/18/2018 ) 15 mL 0    polyethylene glycol (MIRALAX) 17 g packet Take 17 g by mouth daily (Patient not taking: Reported on 10/18/2018 ) 14 each 0    pregabalin (LYRICA) 75 mg capsule Take 1 capsule by mouth 2 (two) times a day for 10 days 20 capsule 0    senna (SENOKOT) 8 6 mg Take 1 tablet (8 6 mg total) by mouth 2 (two) times a day (Patient not taking: Reported on 10/18/2018 ) 120 each 0    valACYclovir (VALTREX) 1,000 mg tablet Take 1 tablet by mouth every 8 (eight) hours for 5 days 15 tablet 0     No current facility-administered medications for this visit  Blood pressure 124/72, pulse (!) 109, weight 94 kg (207 lb 4 oz)  PHYSICAL EXAM:     General Appearance:   Alert, cooperative, no distress, appears stated age    HEENT:   Normocephalic, atraumatic, anicteric      Neck:  Supple, symmetrical, trachea midline, no adenopathy;    thyroid: no enlargement/tenderness/nodules; no carotid  bruit or JVD    Lungs:   Clear to auscultation bilaterally; no rales, rhonchi or wheezing; respirations unlabored    Heart[de-identified]   S1 and S2 normal; regular rate and rhythm; no murmur, rub, or gallop     Abdomen:   Soft, non-tender, non-distended; normal bowel sounds; no masses, no organomegaly    Genitalia:   Deferred    Rectal:   Deferred    Extremities:  No cyanosis, clubbing or edema    Pulses:  2+ and symmetric all extremities    Skin:  Skin color, texture, turgor normal, no rashes or lesions    Lymph nodes:  No palpable cervical, axillary or inguinal lymphadenopathy        Lab Results Component Value Date    WBC 6 45 09/08/2018    HGB 13 2 09/08/2018    HCT 39 1 09/08/2018    MCV 90 09/08/2018     09/08/2018     Lab Results   Component Value Date    CALCIUM 8 3 09/08/2018     09/08/2018    K 3 8 09/08/2018    CO2 26 09/08/2018     09/08/2018    BUN 9 09/08/2018    CREATININE 0 70 09/08/2018     Lab Results   Component Value Date    ALT 38 09/07/2018    AST 21 09/07/2018    ALKPHOS 58 09/07/2018     Lab Results   Component Value Date    INR 0 98 09/07/2018    INR 0 99 10/18/2016    PROTIME 12 9 09/07/2018    PROTIME 13 0 10/18/2016

## 2018-10-18 NOTE — LETTER
October 18, 2018     [address] [facility] [address]    Patient: Nataly Vigil   YOB: 1975   Date of Visit: 10/18/2018       Dear Dr Sandra Paige: Thank you for referring Socorro Gipson to me for evaluation  Below are my notes for this consultation  If you have questions, please do not hesitate to call me  I look forward to following your patient along with you  Sincerely,        Noni Shanks MD        CC: No Recipients  Noni Shanks MD  10/18/2018  3:42 PM  Sign at close encounter  Consultation - [facility]  Nataly Vigil 1975 37 y o  female     ASSESSMENT @ PLAN:   She is a 49-year-old female with recurrent diverticulitis over her lifetime with a sigmoid resection and further episodes of left lower quadrant abdominal pain requiring antibiotics and bowel rest   In addition she will during her hospitalization she had a CT scan that showed no acute diverticulitis and she had rectal bleeding; her last colonoscopy was in 2012    1 will do colonoscopy to investigate    2 I gave her dicyclomine and I told her if she feels an onset of symptoms she could be treated conservatively at home at [facility] with clear liquid diet and antispasmodic therapy    3 I told her that she can reduce her further episodes with no smoking, diet weight loss, exercise more than 2 hr a week, high-fiber more than 25 g a day, red meat less than 4 times a week    Chief Complaint:   Diverticulitis    HPI:   She is a 49-year-old female with hospitalizations at [place] with diverticulitis  She reports that the she was receiving oral antibiotics and she went home and she had no improvement she was admitted with IV antibiotics  She had a CT scan that showed no acute diverticulitis  Her last colonoscopy was in 2012  She had rectal bleeding at that time that was felt to be due to hemorrhoids    She reports in 2004 she started to have multiple episodes of left lower quadrant abdominal pain with diverticulitis  She had a normal colonoscopy  She had multiple episodes over 4 years  She was diagnosed with recurrent diverticulitis and had an exploratory laparotomy surgery in  and had a sigmoid resection  She then had multiple further episodes of diverticulitis  She was well for about for 5 years before this new episode  Nobody in the family has Crohn's disease or ulcerative colitis  She has had colonoscopies and has not had polyps on the before  REVIEW OF SYSTEMS:     CONSTITUTIONAL: Denies any fever, chills, or rigors  Good appetite, and no recent weight loss  HEENT: No earache or tinnitus  Denies hearing loss or visual disturbances  CARDIOVASCULAR: No chest pain or palpitations  RESPIRATORY: Denies any cough, hemoptysis, shortness of breath or dyspnea on exertion  GASTROINTESTINAL: As noted in the History of Present Illness  GENITOURINARY: No problems with urination  Denies any hematuria or dysuria  NEUROLOGIC: No dizziness or vertigo, denies headaches  MUSCULOSKELETAL: Denies any muscle or joint pain  SKIN: Denies skin rashes or itching  ENDOCRINE: Denies excessive thirst  Denies intolerance to heat or cold  PSYCHOSOCIAL: Denies depression or anxiety  Denies any recent memory loss  Past Medical History:   Diagnosis Date    Atrial septal defect     Diverticulitis of colon     Mitral valve prolapse       Past Surgical History:   Procedure Laterality Date    APPENDECTOMY       SECTION      CHOLECYSTECTOMY      HYSTERECTOMY      SIGMOID RESECTION / RECTOPEXY       Social History     Social History    Marital status: /Civil Union     Spouse name: N/A    Number of children: N/A    Years of education: N/A     Occupational History    Not on file       Social History Main Topics    Smoking status: Current Every Day Smoker     Packs/day: 0 50     Types: Cigarettes    Smokeless tobacco: Never Used    Alcohol use No    Drug use: No    Sexual activity: Not on file     Other Topics Concern    Not on file     Social History Narrative    No narrative on file     Family History   Problem Relation Age of Onset    Adopted: Yes    No Known Problems Mother     No Known Problems Father      Patient has no known allergies  Current Outpatient Prescriptions   Medication Sig Dispense Refill    ALPRAZolam (XANAX) 0 5 mg tablet Take 0 5 mg by mouth daily as needed for anxiety        dicyclomine (BENTYL) 20 mg tablet Take 1 tablet (20 mg total) by mouth 3 (three) times a day as needed (abdominal pain) 60 tablet 1    HYDROcodone-acetaminophen (NORCO) 5-325 mg per tablet Take 1 tablet by mouth every 6 (six) hours as needed (Not relieved by Anti-inflammatory) for up to 12 doses Max Daily Amount: 4 tablets (Patient not taking: Reported on 10/18/2018 ) 12 tablet 0    lidocaine viscous (XYLOCAINE) 2 % mucosal solution Apply small amount to the affected area TID PRN (Patient not taking: Reported on 10/18/2018 ) 15 mL 0    polyethylene glycol (MIRALAX) 17 g packet Take 17 g by mouth daily (Patient not taking: Reported on 10/18/2018 ) 14 each 0    pregabalin (LYRICA) 75 mg capsule Take 1 capsule by mouth 2 (two) times a day for 10 days 20 capsule 0    senna (SENOKOT) 8 6 mg Take 1 tablet (8 6 mg total) by mouth 2 (two) times a day (Patient not taking: Reported on 10/18/2018 ) 120 each 0    valACYclovir (VALTREX) 1,000 mg tablet Take 1 tablet by mouth every 8 (eight) hours for 5 days 15 tablet 0     No current facility-administered medications for this visit  Blood pressure 124/72, pulse (!) 109, weight 94 kg (207 lb 4 oz)      PHYSICAL EXAM:     General Appearance:   Alert, cooperative, no distress, appears stated age    HEENT:   Normocephalic, atraumatic, anicteric      Neck:  Supple, symmetrical, trachea midline, no adenopathy;    thyroid: no enlargement/tenderness/nodules; no carotid  bruit or JVD  Lungs:   Clear to auscultation bilaterally; no rales, rhonchi or wheezing; respirations unlabored    Heart[de-identified]   S1 and S2 normal; regular rate and rhythm; no murmur, rub, or gallop     Abdomen:   Soft, non-tender, non-distended; normal bowel sounds; no masses, no organomegaly    Genitalia:   Deferred    Rectal:   Deferred    Extremities:  No cyanosis, clubbing or edema    Pulses:  2+ and symmetric all extremities    Skin:  Skin color, texture, turgor normal, no rashes or lesions    Lymph nodes:  No palpable cervical, axillary or inguinal lymphadenopathy        Lab Results   Component Value Date    WBC 6 45 09/08/2018    HGB 13 2 09/08/2018    HCT 39 1 09/08/2018    MCV 90 09/08/2018     09/08/2018     Lab Results   Component Value Date    CALCIUM 8 3 09/08/2018     09/08/2018    K 3 8 09/08/2018    CO2 26 09/08/2018     09/08/2018    BUN 9 09/08/2018    CREATININE 0 70 09/08/2018     Lab Results   Component Value Date    ALT 38 09/07/2018    AST 21 09/07/2018    ALKPHOS 58 09/07/2018     Lab Results   Component Value Date    INR 0 98 09/07/2018    INR 0 99 10/18/2016    PROTIME 12 9 09/07/2018    PROTIME 13 0 10/18/2016

## 2018-11-04 ENCOUNTER — ANESTHESIA EVENT (OUTPATIENT)
Dept: PERIOP | Facility: HOSPITAL | Age: 43
End: 2018-11-04
Payer: COMMERCIAL

## 2018-11-04 NOTE — ANESTHESIA PREPROCEDURE EVALUATION
Review of Systems/Medical History  Patient summary reviewed        Cardiovascular  EKG reviewed, Negative cardio ROS   Comment: Mvp  asd,  Pulmonary  Smoker cigarette smoker  , Tobacco cessation counseling given ,        GI/Hepatic  Negative GI/hepatic ROS          Negative  ROS        Endo/Other  Negative endo/other ROS      GYN  Negative gynecology ROS          Hematology  Negative hematology ROS      Musculoskeletal  Negative musculoskeletal ROS        Neurology    Headaches,    Psychology   Anxiety,   Chronic opioid dependence            Physical Exam    Airway    Mallampati score: II  TM Distance: >3 FB  Neck ROM: full     Dental       Cardiovascular  Comment: Negative ROS, Cardiovascular exam normal    Pulmonary  Pulmonary exam normal     Other Findings        Anesthesia Plan  ASA Score- 2     Anesthesia Type- IV sedation with anesthesia with ASA Monitors  Additional Monitors:   Airway Plan:         Plan Factors-    Induction- intravenous  Postoperative Plan-     Informed Consent- Anesthetic plan and risks discussed with patient  I personally reviewed this patient with the CRNA  Discussed and agreed on the Anesthesia Plan with the CRNA  Manjit Umana

## 2018-11-05 ENCOUNTER — HOSPITAL ENCOUNTER (OUTPATIENT)
Facility: HOSPITAL | Age: 43
Setting detail: OUTPATIENT SURGERY
Discharge: HOME | End: 2018-11-05
Attending: INTERNAL MEDICINE | Admitting: INTERNAL MEDICINE
Payer: COMMERCIAL

## 2018-11-05 ENCOUNTER — ANESTHESIA (OUTPATIENT)
Dept: PERIOP | Facility: HOSPITAL | Age: 43
End: 2018-11-05
Payer: COMMERCIAL

## 2018-11-05 VITALS
BODY MASS INDEX: 37.5 KG/M2 | DIASTOLIC BLOOD PRESSURE: 77 MMHG | HEART RATE: 90 BPM | RESPIRATION RATE: 19 BRPM | WEIGHT: 198.63 LBS | SYSTOLIC BLOOD PRESSURE: 132 MMHG | OXYGEN SATURATION: 98 % | TEMPERATURE: 98.3 F | HEIGHT: 61 IN

## 2018-11-05 PROCEDURE — 45378 DIAGNOSTIC COLONOSCOPY: CPT | Performed by: INTERNAL MEDICINE

## 2018-11-05 RX ORDER — PROPOFOL 10 MG/ML
INJECTION, EMULSION INTRAVENOUS CONTINUOUS PRN
Status: DISCONTINUED | OUTPATIENT
Start: 2018-11-05 | End: 2018-11-05 | Stop reason: SURG

## 2018-11-05 RX ORDER — PROPOFOL 10 MG/ML
INJECTION, EMULSION INTRAVENOUS AS NEEDED
Status: DISCONTINUED | OUTPATIENT
Start: 2018-11-05 | End: 2018-11-05 | Stop reason: SURG

## 2018-11-05 RX ORDER — SODIUM CHLORIDE, SODIUM LACTATE, POTASSIUM CHLORIDE, CALCIUM CHLORIDE 600; 310; 30; 20 MG/100ML; MG/100ML; MG/100ML; MG/100ML
125 INJECTION, SOLUTION INTRAVENOUS CONTINUOUS
Status: DISCONTINUED | OUTPATIENT
Start: 2018-11-05 | End: 2018-11-05 | Stop reason: HOSPADM

## 2018-11-05 RX ADMIN — PROPOFOL 120 MG: 10 INJECTION, EMULSION INTRAVENOUS at 09:17

## 2018-11-05 RX ADMIN — SODIUM CHLORIDE, SODIUM LACTATE, POTASSIUM CHLORIDE, AND CALCIUM CHLORIDE 125 ML/HR: .6; .31; .03; .02 INJECTION, SOLUTION INTRAVENOUS at 09:00

## 2018-11-05 RX ADMIN — PROPOFOL 100 MCG/KG/MIN: 10 INJECTION, EMULSION INTRAVENOUS at 09:17

## 2018-11-05 NOTE — ANESTHESIA POSTPROCEDURE EVALUATION
Post-Op Assessment Note      CV Status:  Stable    Mental Status:  Lethargic    Hydration Status:  Stable    PONV Controlled:  None    Airway Patency:  Patent    Post Op Vitals Reviewed: Yes          Staff: AnesthesiologistELI           BP   144/88   Temp      Pulse  115   Resp   20   SpO2   97

## 2018-11-05 NOTE — DISCHARGE INSTRUCTIONS
Colonoscopy   WHAT YOU NEED TO KNOW:   A colonoscopy is a procedure to examine the inside of your colon (intestine) with a scope  Polyps or tissue growths may have been removed during your colonoscopy  It is normal to feel bloated and to have some abdominal discomfort  You should be passing gas  If you have hemorrhoids or you had polyps removed, you may have a small amount of bleeding  DISCHARGE INSTRUCTIONS:   Seek care immediately if:   · You have a large amount of bright red blood in your bowel movements  · Your abdomen is hard and firm and you have severe pain  · You have sudden trouble breathing  Contact your healthcare provider if:   · You develop a rash or hives  · You have a fever within 24 hours of your procedure  · You have not had a bowel movement for 3 days after your procedure  · You have questions or concerns about your condition or care  Activity:   · Do not lift, strain, or run  for 3 days after your procedure  · Rest after your procedure  You have been given medicine to relax you  Do not  drive or make important decisions until the day after your procedure  Return to your normal activity as directed  · Relieve gas and discomfort from bloating  by lying on your right side with a heating pad on your abdomen  You may need to take short walks to help the gas move out  Eat small meals until bloating is relieved  If you had polyps removed: For 7 days after your procedure:  · Do not  take aspirin  · Do not  go on long car rides  Help prevent constipation:   · Eat a variety of healthy foods  Healthy foods include fruit, vegetables, whole-grain breads, low-fat dairy products, beans, lean meat, and fish  Ask if you need to be on a special diet  Your healthcare provider may recommend that you eat high-fiber foods such as cooked beans  Fiber helps you have regular bowel movements  · Drink liquids as directed    Adults should drink between 9 and 13 eight-ounce cups of liquid every day  Ask what amount is best for you  For most people, good liquids to drink are water, juice, and milk  · Exercise as directed  Talk to your healthcare provider about the best exercise plan for you  Exercise can help prevent constipation, decrease your blood pressure and improve your health  Follow up with your healthcare provider as directed:  Write down your questions so you remember to ask them during your visits  © 2017 [employer] Information is for End User's use only and may not be sold, redistributed or otherwise used for commercial purposes  All illustrations and images included in CareNotes® are the copyrighted property of [employer]  or [employer]  The above information is an  only  It is not intended as medical advice for individual conditions or treatments  Talk to your doctor, nurse or pharmacist before following any medical regimen to see if it is safe and effective for you

## 2018-11-05 NOTE — OP NOTE
COLONOSCOPY    PROCEDURE: Colonoscopy    INDICATIONS: Abdominal Pain, Chronic    POST-OP DIAGNOSIS: See the impression below    SEDATION: Monitored anesthesia care, check anesthesia records    PHYSICAL EXAM:  Vitals:    11/05/18 0842   BP: 125/77   Pulse: 102   Resp: 22   Temp: 98 7 °F (37 1 °C)   SpO2: 97%     Body mass index is 37 53 kg/m²  General: NAD  Heart: S1 & S2 normal, RRR  Lungs: CTA, No rales or rhonchi  Abdomen: Soft, nontender, nondistended, good bowel sounds    CONSENT:  Informed consent was obtained for the procedure, including sedation after explaining the risks and benefits of the procedure  Risks including but not limited to bleeding, perforation, infection, aspiration were discussed in detail  Also explained about less than 100%$ sensitivity with the exam and other alternatives  PREPARATION:   EKG tracing, pulse oximetry, blood pressure were monitored throughout the procedure  Patient was identified by myself both verbally and by visual inspection of ID band  DESCRIPTION:   Patient was placed in the left lateral decubitus position and was sedated with the above medication  Digital rectal examination was performed  The colonoscope was introduced in to the anal canal and advanced up to cecum, which was identified by the appendiceal orifice and IC valve  A careful inspection was made as the colonoscope was withdrawn, including a retroflexed view of the rectum; findings and interventions are described below  Appropriate photodocumentation was obtained  The quality of the colonic preparation was excellent  The blood loss was minimal     FINDINGS:  The colon anastomosis was noted at 20 cm and appeared normal   There was diverticulosis noted in the descending colon hepatic flexure and ascending colon    The cecum transverse colon sigmoid colon and rectum appeared normal   Internal hemorrhoids were noted on retroflexion         IMPRESSIONS:    Normal colon anastomosis  Diverticulosis  Internal hemorrhoids    RECOMMENDATIONS:  Fiber diet  Repeat colonoscopy in 5 years    COMPLICATIONS:  None; patient tolerated the procedure well    DISPOSITION: PACU  CONDITION: Stable    Erika Ortega MD  11/5/2018,9:23 AM

## 2018-11-05 NOTE — DISCHARGE INSTR - AVS FIRST PAGE
COLONOSCOPY    PROCEDURE: Colonoscopy    INDICATIONS: Abdominal Pain, Chronic    POST-OP DIAGNOSIS: See the impression below    SEDATION: Monitored anesthesia care, check anesthesia records    PHYSICAL EXAM:  Vitals:    11/05/18 0842   BP: 125/77   Pulse: 102   Resp: 22   Temp: 98 7 °F (37 1 °C)   SpO2: 97%     Body mass index is 37 53 kg/m²  General: NAD  Heart: S1 & S2 normal, RRR  Lungs: CTA, No rales or rhonchi  Abdomen: Soft, nontender, nondistended, good bowel sounds    CONSENT:  Informed consent was obtained for the procedure, including sedation after explaining the risks and benefits of the procedure  Risks including but not limited to bleeding, perforation, infection, aspiration were discussed in detail  Also explained about less than 100%$ sensitivity with the exam and other alternatives  PREPARATION:   EKG tracing, pulse oximetry, blood pressure were monitored throughout the procedure  Patient was identified by myself both verbally and by visual inspection of ID band  DESCRIPTION:   Patient was placed in the left lateral decubitus position and was sedated with the above medication  Digital rectal examination was performed  The colonoscope was introduced in to the anal canal and advanced up to cecum, which was identified by the appendiceal orifice and IC valve  A careful inspection was made as the colonoscope was withdrawn, including a retroflexed view of the rectum; findings and interventions are described below  Appropriate photodocumentation was obtained  The quality of the colonic preparation was excellent  The blood loss was minimal     FINDINGS:  The colon anastomosis was noted at 20 cm and appeared normal   There was diverticulosis noted in the descending colon hepatic flexure and ascending colon    The cecum transverse colon sigmoid colon and rectum appeared normal   Internal hemorrhoids were noted on retroflexion         IMPRESSIONS:    Normal colon anastomosis  Diverticulosis  Internal hemorrhoids    RECOMMENDATIONS:  Fiber diet  Repeat colonoscopy in 5 years    COMPLICATIONS:  None; patient tolerated the procedure well    DISPOSITION: PACU  CONDITION: Stable    Junior Suarez MD  11/5/2018,9:23 AM

## 2019-02-11 ENCOUNTER — EVALUATION (OUTPATIENT)
Dept: OCCUPATIONAL THERAPY | Facility: CLINIC | Age: 44
End: 2019-02-11
Payer: COMMERCIAL

## 2019-02-11 DIAGNOSIS — M54.12 CERVICAL RADICULOPATHY: Primary | ICD-10-CM

## 2019-02-11 PROCEDURE — 97535 SELF CARE MNGMENT TRAINING: CPT

## 2019-02-11 PROCEDURE — 97166 OT EVAL MOD COMPLEX 45 MIN: CPT

## 2019-02-11 NOTE — PROGRESS NOTES
OT Evaluation      Today's date: 2019  Patient name: Cat Leal  : 1975  MRN: [id number]  Referring provider: Brian Hayward DO  Dx: No diagnosis found            Assessment  Assessment details: Patient presenting to OP OT services with a dx of right hand and shoulder pain  Patient's last MD appointment was on 2019  Patient's next follow-up appointment is on ***  Patient symptoms included ***  Impairments: abnormal coordination, abnormal or restricted ROM and impaired physical strength    Symptom irritability: moderateBarriers to therapy: PMH:  Understanding of Dx/Px/POC: good   Prognosis: good   Goals    STGs    Pt will increase  strength by 5-10#  Pt will increase shoulder strength by 1/2 grade  Pt will increase shoulder ROM by 50%     Pt will report decrease in morning stiffness by 25%    Pt will report a decrease in sensation deficits by 25%    Independent with HEP      LTGs     Pt will increase  strength by an additional 5-10#  Pt will increase shoulder strength by 1-2 grade  Pt will report decrease in morning stiffness by 50%    Pt will report an increase in ADL/IADL participation    Pt will report a decrease in sensation deficits by 50%     Plan  Plan details: Patient has presenting to OP OT services with a dx of right hand and shoulder pain  Patient demonstrating increased pain, decreased strength, decreased ROM and decreased activity  Pt would benefit from continued Occupational Therapy services two times per week for 4 weeks to return to prior level of function and achieve all established goals   Thank you for the referral!    Patient would benefit from: OT eval and skilled occupational therapy  Referral necessary: Yes  Planned modality interventions: ultrasound, unattended electrical stimulation, thermotherapy: hydrocollator packs and cryotherapy  Planned therapy interventions: massage, manual therapy, joint mobilization, patient education, strengthening, stretching, fine motor coordination training and home exercise program  Frequency: 2x week  Duration in visits: 8  Duration in weeks: 4  Treatment plan discussed with: patient         Subjective Evaluation     History of Present Illness  Mechanism of injury: Right hand and shoulder pain  Quality of life: good    Pain  Location: R Hand/Shoulder              Objective        Precautions: NA     Specialty Daily Treatment Diary      Manual  01/14/2019           Occipital Neck Release                                                                           Exercise Diary  01/14/2019           Lateral Neck Stretch             Levator Scapulae Stretch             Cervical Retraction Stretch             Cervical Protraction Stretch                                                                                                                                                                                                                                                   Modalities 01/14/2019           E-STIM with                                                   Patient tolerated session well  Patient and therapist discussed exercises as per initial HEP  Patient verbalized understanding of education and demonstrated proper technique  Therapist will make increases as tolerated   Continue with POC

## 2019-02-11 NOTE — LETTER
2019      [address]  [facility] [address]    Patient: Francisco Javier Guaman   YOB: 1975   Date of Visit: 2019     Encounter Diagnosis     ICD-10-CM    1  Cervical radiculopathy M54 12        Dear Dr Lito Reyes:    Please review the attached Plan of Care from [place] Sabino's recent visit  Please verify that you agree therapy should continue by signing the attached document and sending it back to our office  If you have any questions or concerns, please don't hesitate to call  Sincerely,    Fayetta Duverney, OT      Referring Provider:     I certify that I have read the below Plan of Care and certify the need for these services furnished under this plan of treatment while under my care  Matthew Kasper DO  [address] [address] [address]  [facility] [place]: [phone]        OT Evaluation     Today's date: 2019  Patient name: Francisco Javier Guaman  : 1975  MRN: [id number]  Referring provider: Manolo Murphy DO  Dx:   Encounter Diagnosis     ICD-10-CM    1  Cervical radiculopathy M54 12          Assessment  Assessment details: Patient presenting to OP OT services with a dx of right hand and upper extremity numbness  Patient's last MD appointment was on a "few weeks ago" with Dr Lito Reyes  Patient has not had any diagnostic testing completed  Patient's did not schedule a follow-up with MD   Patient symptoms included numbness and burning pain in right UE approximatley two and a half weeks ago  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 secondary to numbness and pain in right upper extremity  Impairments: impaired physical strength  Barriers to therapy: PMH: See medical chart  Understanding of Dx/Px/POC: good   Prognosis: good    Goals  STGs    Pt will increase  strength by 5-10#  Pt will increase right upper extremity strength by 1/2 grade      Pt will report an increase in sleeping ability by 25%    Pt will report a decrease in sensation deficits by 25%    Independent with HEP      LTGs     Pt will increase  strength by an additional 5-10#  Pt will increase right upper extremity strength by 1-2 grade  Pt will report an increase in sleeping ability by 50%    Pt will report an increase in ADL/IADL participation    Pt will report a decrease in sensation deficits by 50%      Plan  Plan details: Patient has presenting to OP OT services with a dx of right hand and upper extremity numbness  Patient demonstrating increased numbness, decreased strength, and decreased activity  Pt would benefit from continued Occupational Therapy services two times per week for 4 weeks to return to prior level of function and achieve all established goals  Thank you for the referral!    Patient would benefit from: OT eval and skilled occupational therapy  Referral necessary: Yes  Planned modality interventions: ultrasound, unattended electrical stimulation, thermotherapy: hydrocollator packs and cryotherapy  Planned therapy interventions: massage, manual therapy, joint mobilization, patient education, strengthening, stretching, fine motor coordination training and home exercise program  Frequency: 2x week  Duration in visits: 8  Duration in weeks: 4  Treatment plan discussed with: patient        Subjective Evaluation    History of Present Illness  Onset date: Two weeks ago    Mechanism of injury: No pathology noted  Pain  Current pain ratin  At best pain ratin  At worst pain rating: 10  Quality: radiating and burning    Hand dominance: right    Treatments  Current treatment: occupational therapy  Patient Goals  Patient goals for therapy: increased motion, increased strength and independence with ADLs/IADLs  Patient goal: Decrease numbness        Objective     Neurological Testing     Sensation     Wrist/Hand   Left   Intact: light touch    Right   Diminished: light touch    Strength/Myotome Testing     Left Wrist/Hand   Normal wrist strength     (2nd hand position)     Trial 1: 25    Right Wrist/Hand   Wrist extension: 4-  Wrist flexion: 4-  Radial deviation: 4-  Ulnar deviation: 4-     (2nd hand position)     Trial 1: 35          Precautions NA    Specialty Daily Treatment Diary     Manual  02/11/2019       Cervical Stretches        Doorway Stretch        Cervical Rotation Towel Stretch        Lateral Cervical Stretch        Levator Scapulae Stretch            Exercise Diary  02/11/2019       Ulnar Nerve Glide (Waiters Plate)        Ulnar Nerve Glide (Raccoon)        Wrist Flexor Stretch        Digi-Flex        Pronation        Wrist Flexion        UBE Machine        Shoulder Retractions        Cervical Retractions                                                                                                    Modalities 02/11/2019        Performed                         Patient tolerated session well  Patient and therapist discussed exercises as per initial HEP  Patient verbalized understanding of education and demonstrated proper technique  Therapist will make increases as tolerated   Continue with POC

## 2019-02-11 NOTE — PROGRESS NOTES
OT Evaluation     Today's date: 2019  Patient name: Ирина Baig  : 1975  MRN: [id number]  Referring provider: Jeremy Ballard DO  Dx:   Encounter Diagnosis     ICD-10-CM    1  Cervical radiculopathy M54 12          Assessment  Assessment details: Patient presenting to OP OT services with a dx of right hand and upper extremity numbness  Patient's last MD appointment was on a "few weeks ago" with Dr Rico Camacho  Patient has not had any diagnostic testing completed  Patient's did not schedule a follow-up with MD   Patient symptoms included numbness and burning pain in right UE approximatley two and a half weeks ago  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 secondary to numbness and pain in right upper extremity  Impairments: impaired physical strength  Barriers to therapy: PMH: See medical chart  Understanding of Dx/Px/POC: good   Prognosis: good    Goals  STGs    Pt will increase  strength by 5-10#  Pt will increase right upper extremity strength by 1/2 grade  Pt will report an increase in sleeping ability by 25%    Pt will report a decrease in sensation deficits by 25%    Independent with HEP      LTGs     Pt will increase  strength by an additional 5-10#  Pt will increase right upper extremity strength by 1-2 grade  Pt will report an increase in sleeping ability by 50%    Pt will report an increase in ADL/IADL participation    Pt will report a decrease in sensation deficits by 50%      Plan  Plan details: Patient has presenting to OP OT services with a dx of right hand and upper extremity numbness  Patient demonstrating increased numbness, decreased strength, and decreased activity  Pt would benefit from continued Occupational Therapy services two times per week for 4 weeks to return to prior level of function and achieve all established goals   Thank you for the referral!    Patient would benefit from: OT eval and skilled occupational therapy  Referral necessary: Yes  Planned modality interventions: ultrasound, unattended electrical stimulation, thermotherapy: hydrocollator packs and cryotherapy  Planned therapy interventions: massage, manual therapy, joint mobilization, patient education, strengthening, stretching, fine motor coordination training and home exercise program  Frequency: 2x week  Duration in visits: 8  Duration in weeks: 4  Treatment plan discussed with: patient        Subjective Evaluation    History of Present Illness  Onset date: Two weeks ago    Mechanism of injury: No pathology noted  Pain  Current pain ratin  At best pain ratin  At worst pain rating: 10  Quality: radiating and burning    Hand dominance: right    Treatments  Current treatment: occupational therapy  Patient Goals  Patient goals for therapy: increased motion, increased strength and independence with ADLs/IADLs  Patient goal: Decrease numbness        Objective     Neurological Testing     Sensation     Wrist/Hand   Left   Intact: light touch    Right   Diminished: light touch    Strength/Myotome Testing     Left Wrist/Hand   Normal wrist strength     (2nd hand position)     Trial 1: 25    Right Wrist/Hand   Wrist extension: 4-  Wrist flexion: 4-  Radial deviation: 4-  Ulnar deviation: 4-     (2nd hand position)     Trial 1: 35          Precautions NA    Specialty Daily Treatment Diary     Manual  2019       Cervical Stretches        Doorway Stretch        Cervical Rotation Towel Stretch        Lateral Cervical Stretch        Levator Scapulae Stretch            Exercise Diary  2019       Ulnar Nerve Glide (Waiters Plate)        Ulnar Nerve Glide (Raccoon)        Wrist Flexor Stretch        Digi-Flex        Pronation        Wrist Flexion        UBE Machine        Shoulder Retractions        Cervical Retractions                                                                                                    Modalities 2019        Performed                         Patient tolerated session well  Patient and therapist discussed exercises as per initial HEP  Patient verbalized understanding of education and demonstrated proper technique  Therapist will make increases as tolerated   Continue with POC

## 2019-02-18 ENCOUNTER — APPOINTMENT (OUTPATIENT)
Dept: OCCUPATIONAL THERAPY | Facility: CLINIC | Age: 44
End: 2019-02-18
Payer: COMMERCIAL

## 2019-02-19 ENCOUNTER — TRANSCRIBE ORDERS (OUTPATIENT)
Dept: PHYSICAL THERAPY | Facility: CLINIC | Age: 44
End: 2019-02-19

## 2019-02-19 DIAGNOSIS — M54.12 CERVICAL RADICULITIS: Primary | ICD-10-CM

## 2019-02-20 ENCOUNTER — APPOINTMENT (OUTPATIENT)
Dept: OCCUPATIONAL THERAPY | Facility: CLINIC | Age: 44
End: 2019-02-20
Payer: COMMERCIAL

## 2019-03-14 NOTE — PROGRESS NOTES
OT DISCHARGE    Patient has Inconsistent attended OP OT services since start of care  Patient has attended 1 visits since start of care  Patient last missed visit/visit was on 02/11/2019  Patient has not returned to clinic for further treatment  If patient would like to resume therapy in the future, a new prescription will be required  Thank you for the referral  Please refer to patient's last assessment for most recent objective measurements

## 2019-03-18 ENCOUNTER — TRANSCRIBE ORDERS (OUTPATIENT)
Dept: ADMINISTRATIVE | Facility: HOSPITAL | Age: 44
End: 2019-03-18

## 2019-03-18 DIAGNOSIS — Z12.31 VISIT FOR SCREENING MAMMOGRAM: Primary | ICD-10-CM

## 2019-03-18 DIAGNOSIS — N63.10 LUMP OF RIGHT BREAST: ICD-10-CM

## 2022-07-28 ENCOUNTER — APPOINTMENT (EMERGENCY)
Dept: CT IMAGING | Facility: HOSPITAL | Age: 47
End: 2022-07-28
Payer: COMMERCIAL

## 2022-07-28 ENCOUNTER — HOSPITAL ENCOUNTER (EMERGENCY)
Facility: HOSPITAL | Age: 47
End: 2022-07-30
Attending: EMERGENCY MEDICINE | Admitting: EMERGENCY MEDICINE
Payer: COMMERCIAL

## 2022-07-28 DIAGNOSIS — N20.1 URETEROLITHIASIS: ICD-10-CM

## 2022-07-28 DIAGNOSIS — N20.0 KIDNEY STONE ON RIGHT SIDE: Primary | ICD-10-CM

## 2022-07-28 DIAGNOSIS — R11.2 NAUSEA AND VOMITING, UNSPECIFIED VOMITING TYPE: ICD-10-CM

## 2022-07-28 LAB
ALBUMIN SERPL BCP-MCNC: 4.3 G/DL (ref 3.5–5)
ALP SERPL-CCNC: 50 U/L (ref 46–116)
ALT SERPL W P-5'-P-CCNC: 32 U/L (ref 12–78)
ANION GAP SERPL CALCULATED.3IONS-SCNC: 15 MMOL/L (ref 4–13)
APTT PPP: 28 SECONDS (ref 23–37)
AST SERPL W P-5'-P-CCNC: 19 U/L (ref 5–45)
BASOPHILS # BLD AUTO: 0.05 THOUSANDS/ΜL (ref 0–0.1)
BASOPHILS NFR BLD AUTO: 0 % (ref 0–1)
BILIRUB SERPL-MCNC: 0.88 MG/DL (ref 0.2–1)
BUN SERPL-MCNC: 29 MG/DL (ref 5–25)
CALCIUM SERPL-MCNC: 9.9 MG/DL (ref 8.3–10.1)
CHLORIDE SERPL-SCNC: 101 MMOL/L (ref 96–108)
CO2 SERPL-SCNC: 23 MMOL/L (ref 21–32)
CREAT SERPL-MCNC: 1.72 MG/DL (ref 0.6–1.3)
EOSINOPHIL # BLD AUTO: 0.01 THOUSAND/ΜL (ref 0–0.61)
EOSINOPHIL NFR BLD AUTO: 0 % (ref 0–6)
ERYTHROCYTE [DISTWIDTH] IN BLOOD BY AUTOMATED COUNT: 13 % (ref 11.6–15.1)
FLUAV RNA RESP QL NAA+PROBE: NEGATIVE
FLUBV RNA RESP QL NAA+PROBE: NEGATIVE
GFR SERPL CREATININE-BSD FRML MDRD: 34 ML/MIN/1.73SQ M
GLUCOSE SERPL-MCNC: 104 MG/DL (ref 65–140)
HCT VFR BLD AUTO: 44.5 % (ref 34.8–46.1)
HGB BLD-MCNC: 15.5 G/DL (ref 11.5–15.4)
IMM GRANULOCYTES # BLD AUTO: 0.09 THOUSAND/UL (ref 0–0.2)
IMM GRANULOCYTES NFR BLD AUTO: 1 % (ref 0–2)
INR PPP: 1.02 (ref 0.84–1.19)
LACTATE SERPL-SCNC: 1 MMOL/L (ref 0.5–2)
LIPASE SERPL-CCNC: 326 U/L (ref 73–393)
LYMPHOCYTES # BLD AUTO: 1.81 THOUSANDS/ΜL (ref 0.6–4.47)
LYMPHOCYTES NFR BLD AUTO: 14 % (ref 14–44)
MAGNESIUM SERPL-MCNC: 2.1 MG/DL (ref 1.6–2.6)
MCH RBC QN AUTO: 30.6 PG (ref 26.8–34.3)
MCHC RBC AUTO-ENTMCNC: 34.8 G/DL (ref 31.4–37.4)
MCV RBC AUTO: 88 FL (ref 82–98)
MONOCYTES # BLD AUTO: 1.23 THOUSAND/ΜL (ref 0.17–1.22)
MONOCYTES NFR BLD AUTO: 9 % (ref 4–12)
NEUTROPHILS # BLD AUTO: 9.9 THOUSANDS/ΜL (ref 1.85–7.62)
NEUTS SEG NFR BLD AUTO: 76 % (ref 43–75)
NRBC BLD AUTO-RTO: 0 /100 WBCS
PLATELET # BLD AUTO: 320 THOUSANDS/UL (ref 149–390)
PMV BLD AUTO: 10.4 FL (ref 8.9–12.7)
POTASSIUM SERPL-SCNC: 3.1 MMOL/L (ref 3.5–5.3)
PROT SERPL-MCNC: 8.7 G/DL (ref 6.4–8.4)
PROTHROMBIN TIME: 13.2 SECONDS (ref 11.6–14.5)
RBC # BLD AUTO: 5.06 MILLION/UL (ref 3.81–5.12)
RSV RNA RESP QL NAA+PROBE: NEGATIVE
SARS-COV-2 RNA RESP QL NAA+PROBE: NEGATIVE
SODIUM SERPL-SCNC: 139 MMOL/L (ref 135–147)
WBC # BLD AUTO: 13.09 THOUSAND/UL (ref 4.31–10.16)

## 2022-07-28 PROCEDURE — 36415 COLL VENOUS BLD VENIPUNCTURE: CPT

## 2022-07-28 PROCEDURE — 87040 BLOOD CULTURE FOR BACTERIA: CPT | Performed by: SURGERY

## 2022-07-28 PROCEDURE — 99285 EMERGENCY DEPT VISIT HI MDM: CPT

## 2022-07-28 PROCEDURE — 74177 CT ABD & PELVIS W/CONTRAST: CPT

## 2022-07-28 PROCEDURE — 85025 COMPLETE CBC W/AUTO DIFF WBC: CPT | Performed by: EMERGENCY MEDICINE

## 2022-07-28 PROCEDURE — 96361 HYDRATE IV INFUSION ADD-ON: CPT

## 2022-07-28 PROCEDURE — 83690 ASSAY OF LIPASE: CPT | Performed by: EMERGENCY MEDICINE

## 2022-07-28 PROCEDURE — 83735 ASSAY OF MAGNESIUM: CPT | Performed by: SURGERY

## 2022-07-28 PROCEDURE — 85610 PROTHROMBIN TIME: CPT | Performed by: SURGERY

## 2022-07-28 PROCEDURE — 83605 ASSAY OF LACTIC ACID: CPT | Performed by: SURGERY

## 2022-07-28 PROCEDURE — 0241U HB NFCT DS VIR RESP RNA 4 TRGT: CPT | Performed by: SURGERY

## 2022-07-28 PROCEDURE — 80053 COMPREHEN METABOLIC PANEL: CPT | Performed by: EMERGENCY MEDICINE

## 2022-07-28 PROCEDURE — 96375 TX/PRO/DX INJ NEW DRUG ADDON: CPT

## 2022-07-28 PROCEDURE — 85730 THROMBOPLASTIN TIME PARTIAL: CPT | Performed by: SURGERY

## 2022-07-28 RX ORDER — POTASSIUM CHLORIDE 20 MEQ/1
40 TABLET, EXTENDED RELEASE ORAL ONCE
Status: COMPLETED | OUTPATIENT
Start: 2022-07-28 | End: 2022-07-28

## 2022-07-28 RX ADMIN — MORPHINE SULFATE 2 MG: 2 INJECTION, SOLUTION INTRAMUSCULAR; INTRAVENOUS at 22:29

## 2022-07-28 RX ADMIN — IOHEXOL 70 ML: 350 INJECTION, SOLUTION INTRAVENOUS at 22:40

## 2022-07-28 RX ADMIN — SODIUM CHLORIDE 1000 ML: 9 INJECTION, SOLUTION INTRAVENOUS at 22:28

## 2022-07-28 RX ADMIN — POTASSIUM CHLORIDE 40 MEQ: 1500 TABLET, EXTENDED RELEASE ORAL at 22:05

## 2022-07-29 ENCOUNTER — ANESTHESIA EVENT (INPATIENT)
Dept: PERIOP | Facility: HOSPITAL | Age: 47
DRG: 446 | End: 2022-07-29
Payer: COMMERCIAL

## 2022-07-29 ENCOUNTER — ANESTHESIA (INPATIENT)
Dept: PERIOP | Facility: HOSPITAL | Age: 47
DRG: 446 | End: 2022-07-29
Payer: COMMERCIAL

## 2022-07-29 PROBLEM — N17.9 AKI (ACUTE KIDNEY INJURY) (HCC): Status: ACTIVE | Noted: 2022-07-29

## 2022-07-29 PROBLEM — N13.2 HYDRONEPHROSIS WITH URINARY OBSTRUCTION DUE TO URETERAL CALCULUS: Status: ACTIVE | Noted: 2022-07-29

## 2022-07-29 PROBLEM — N20.1 URETEROLITHIASIS: Status: ACTIVE | Noted: 2022-07-29

## 2022-07-29 PROBLEM — E87.8 ELECTROLYTE ABNORMALITY: Status: ACTIVE | Noted: 2022-07-29

## 2022-07-29 LAB
ANION GAP SERPL CALCULATED.3IONS-SCNC: 10 MMOL/L (ref 4–13)
BACTERIA UR QL AUTO: ABNORMAL /HPF
BASOPHILS # BLD AUTO: 0.02 THOUSANDS/ΜL (ref 0–0.1)
BASOPHILS NFR BLD AUTO: 0 % (ref 0–1)
BILIRUB UR QL STRIP: ABNORMAL
BUN SERPL-MCNC: 23 MG/DL (ref 5–25)
CALCIUM SERPL-MCNC: 8.7 MG/DL (ref 8.3–10.1)
CHLORIDE SERPL-SCNC: 107 MMOL/L (ref 96–108)
CLARITY UR: CLEAR
CO2 SERPL-SCNC: 25 MMOL/L (ref 21–32)
COLOR UR: YELLOW
CREAT SERPL-MCNC: 1.32 MG/DL (ref 0.6–1.3)
EOSINOPHIL # BLD AUTO: 0.01 THOUSAND/ΜL (ref 0–0.61)
EOSINOPHIL NFR BLD AUTO: 0 % (ref 0–6)
ERYTHROCYTE [DISTWIDTH] IN BLOOD BY AUTOMATED COUNT: 13.1 % (ref 11.6–15.1)
GFR SERPL CREATININE-BSD FRML MDRD: 47 ML/MIN/1.73SQ M
GLUCOSE SERPL-MCNC: 108 MG/DL (ref 65–140)
GLUCOSE UR STRIP-MCNC: NEGATIVE MG/DL
HCT VFR BLD AUTO: 39.2 % (ref 34.8–46.1)
HGB BLD-MCNC: 13.4 G/DL (ref 11.5–15.4)
HGB UR QL STRIP.AUTO: ABNORMAL
IMM GRANULOCYTES # BLD AUTO: 0.06 THOUSAND/UL (ref 0–0.2)
IMM GRANULOCYTES NFR BLD AUTO: 1 % (ref 0–2)
KETONES UR STRIP-MCNC: NEGATIVE MG/DL
LEUKOCYTE ESTERASE UR QL STRIP: NEGATIVE
LYMPHOCYTES # BLD AUTO: 1.47 THOUSANDS/ΜL (ref 0.6–4.47)
LYMPHOCYTES NFR BLD AUTO: 14 % (ref 14–44)
MCH RBC QN AUTO: 30.9 PG (ref 26.8–34.3)
MCHC RBC AUTO-ENTMCNC: 34.2 G/DL (ref 31.4–37.4)
MCV RBC AUTO: 90 FL (ref 82–98)
MONOCYTES # BLD AUTO: 1.04 THOUSAND/ΜL (ref 0.17–1.22)
MONOCYTES NFR BLD AUTO: 10 % (ref 4–12)
NEUTROPHILS # BLD AUTO: 8.11 THOUSANDS/ΜL (ref 1.85–7.62)
NEUTS SEG NFR BLD AUTO: 75 % (ref 43–75)
NITRITE UR QL STRIP: NEGATIVE
NON-SQ EPI CELLS URNS QL MICRO: ABNORMAL /HPF
NRBC BLD AUTO-RTO: 0 /100 WBCS
PH UR STRIP.AUTO: 6 [PH]
PLATELET # BLD AUTO: 246 THOUSANDS/UL (ref 149–390)
PMV BLD AUTO: 10.8 FL (ref 8.9–12.7)
POTASSIUM SERPL-SCNC: 3.5 MMOL/L (ref 3.5–5.3)
PROT UR STRIP-MCNC: NEGATIVE MG/DL
RBC # BLD AUTO: 4.34 MILLION/UL (ref 3.81–5.12)
RBC #/AREA URNS AUTO: ABNORMAL /HPF
SODIUM SERPL-SCNC: 142 MMOL/L (ref 135–147)
SP GR UR STRIP.AUTO: 1.02 (ref 1–1.03)
UROBILINOGEN UR QL STRIP.AUTO: 0.2 E.U./DL
WBC # BLD AUTO: 10.71 THOUSAND/UL (ref 4.31–10.16)
WBC #/AREA URNS AUTO: ABNORMAL /HPF

## 2022-07-29 PROCEDURE — 85025 COMPLETE CBC W/AUTO DIFF WBC: CPT | Performed by: PHYSICIAN ASSISTANT

## 2022-07-29 PROCEDURE — 81001 URINALYSIS AUTO W/SCOPE: CPT | Performed by: SURGERY

## 2022-07-29 PROCEDURE — 80048 BASIC METABOLIC PNL TOTAL CA: CPT | Performed by: PHYSICIAN ASSISTANT

## 2022-07-29 PROCEDURE — 99285 EMERGENCY DEPT VISIT HI MDM: CPT | Performed by: SURGERY

## 2022-07-29 PROCEDURE — 36415 COLL VENOUS BLD VENIPUNCTURE: CPT | Performed by: PHYSICIAN ASSISTANT

## 2022-07-29 PROCEDURE — 99245 OFF/OP CONSLTJ NEW/EST HI 55: CPT | Performed by: STUDENT IN AN ORGANIZED HEALTH CARE EDUCATION/TRAINING PROGRAM

## 2022-07-29 PROCEDURE — 96366 THER/PROPH/DIAG IV INF ADDON: CPT

## 2022-07-29 PROCEDURE — 96365 THER/PROPH/DIAG IV INF INIT: CPT

## 2022-07-29 PROCEDURE — 99205 OFFICE O/P NEW HI 60 MIN: CPT | Performed by: NURSE PRACTITIONER

## 2022-07-29 PROCEDURE — 96375 TX/PRO/DX INJ NEW DRUG ADDON: CPT

## 2022-07-29 PROCEDURE — 96367 TX/PROPH/DG ADDL SEQ IV INF: CPT

## 2022-07-29 PROCEDURE — 96376 TX/PRO/DX INJ SAME DRUG ADON: CPT

## 2022-07-29 RX ORDER — HYDROMORPHONE HCL/PF 1 MG/ML
1 SYRINGE (ML) INJECTION EVERY 4 HOURS PRN
Status: DISCONTINUED | OUTPATIENT
Start: 2022-07-29 | End: 2022-07-30 | Stop reason: HOSPADM

## 2022-07-29 RX ORDER — SODIUM CHLORIDE, SODIUM GLUCONATE, SODIUM ACETATE, POTASSIUM CHLORIDE, MAGNESIUM CHLORIDE, SODIUM PHOSPHATE, DIBASIC, AND POTASSIUM PHOSPHATE .53; .5; .37; .037; .03; .012; .00082 G/100ML; G/100ML; G/100ML; G/100ML; G/100ML; G/100ML; G/100ML
100 INJECTION, SOLUTION INTRAVENOUS CONTINUOUS
Status: DISCONTINUED | OUTPATIENT
Start: 2022-07-29 | End: 2022-07-30 | Stop reason: HOSPADM

## 2022-07-29 RX ORDER — ONDANSETRON 2 MG/ML
4 INJECTION INTRAMUSCULAR; INTRAVENOUS EVERY 4 HOURS PRN
Status: DISCONTINUED | OUTPATIENT
Start: 2022-07-29 | End: 2022-07-30 | Stop reason: HOSPADM

## 2022-07-29 RX ORDER — HYDROMORPHONE HCL/PF 1 MG/ML
0.5 SYRINGE (ML) INJECTION ONCE
Status: COMPLETED | OUTPATIENT
Start: 2022-07-29 | End: 2022-07-29

## 2022-07-29 RX ORDER — TAMSULOSIN HYDROCHLORIDE 0.4 MG/1
0.4 CAPSULE ORAL ONCE
Status: COMPLETED | OUTPATIENT
Start: 2022-07-29 | End: 2022-07-29

## 2022-07-29 RX ORDER — TAMSULOSIN HYDROCHLORIDE 0.4 MG/1
0.4 CAPSULE ORAL
Status: DISCONTINUED | OUTPATIENT
Start: 2022-07-30 | End: 2022-07-30 | Stop reason: HOSPADM

## 2022-07-29 RX ORDER — SENNOSIDES 8.6 MG
1 TABLET ORAL
Status: DISCONTINUED | OUTPATIENT
Start: 2022-07-29 | End: 2022-07-30 | Stop reason: HOSPADM

## 2022-07-29 RX ORDER — DOCUSATE SODIUM 100 MG/1
100 CAPSULE, LIQUID FILLED ORAL 2 TIMES DAILY
Status: DISCONTINUED | OUTPATIENT
Start: 2022-07-29 | End: 2022-07-30 | Stop reason: HOSPADM

## 2022-07-29 RX ORDER — OXYCODONE HYDROCHLORIDE 5 MG/1
5 TABLET ORAL EVERY 6 HOURS PRN
Status: DISCONTINUED | OUTPATIENT
Start: 2022-07-29 | End: 2022-07-29

## 2022-07-29 RX ORDER — OXYCODONE HYDROCHLORIDE 10 MG/1
10 TABLET ORAL EVERY 6 HOURS PRN
Status: DISCONTINUED | OUTPATIENT
Start: 2022-07-29 | End: 2022-07-30 | Stop reason: HOSPADM

## 2022-07-29 RX ORDER — TAMSULOSIN HYDROCHLORIDE 0.4 MG/1
0.4 CAPSULE ORAL
Status: DISCONTINUED | OUTPATIENT
Start: 2022-07-29 | End: 2022-07-29

## 2022-07-29 RX ORDER — HYDROMORPHONE HCL/PF 1 MG/ML
1 SYRINGE (ML) INJECTION ONCE
Status: COMPLETED | OUTPATIENT
Start: 2022-07-29 | End: 2022-07-29

## 2022-07-29 RX ORDER — CEFTRIAXONE 1 G/50ML
1000 INJECTION, SOLUTION INTRAVENOUS ONCE
Status: COMPLETED | OUTPATIENT
Start: 2022-07-29 | End: 2022-07-29

## 2022-07-29 RX ORDER — HYDROMORPHONE HCL/PF 1 MG/ML
0.5 SYRINGE (ML) INJECTION EVERY 4 HOURS PRN
Status: DISCONTINUED | OUTPATIENT
Start: 2022-07-29 | End: 2022-07-29

## 2022-07-29 RX ADMIN — CEFTRIAXONE 1000 MG: 1 INJECTION, SOLUTION INTRAVENOUS at 01:19

## 2022-07-29 RX ADMIN — OXYCODONE HYDROCHLORIDE 5 MG: 5 TABLET ORAL at 14:17

## 2022-07-29 RX ADMIN — HYDROMORPHONE HYDROCHLORIDE 0.5 MG: 1 INJECTION, SOLUTION INTRAMUSCULAR; INTRAVENOUS; SUBCUTANEOUS at 09:12

## 2022-07-29 RX ADMIN — HYDROMORPHONE HYDROCHLORIDE 1 MG: 1 INJECTION, SOLUTION INTRAMUSCULAR; INTRAVENOUS; SUBCUTANEOUS at 21:06

## 2022-07-29 RX ADMIN — TAMSULOSIN HYDROCHLORIDE 0.4 MG: 0.4 CAPSULE ORAL at 06:31

## 2022-07-29 RX ADMIN — SODIUM CHLORIDE, SODIUM GLUCONATE, SODIUM ACETATE, POTASSIUM CHLORIDE, MAGNESIUM CHLORIDE, SODIUM PHOSPHATE, DIBASIC, AND POTASSIUM PHOSPHATE 100 ML/HR: .53; .5; .37; .037; .03; .012; .00082 INJECTION, SOLUTION INTRAVENOUS at 06:32

## 2022-07-29 RX ADMIN — HYDROMORPHONE HYDROCHLORIDE 1 MG: 1 INJECTION, SOLUTION INTRAMUSCULAR; INTRAVENOUS; SUBCUTANEOUS at 00:50

## 2022-07-29 RX ADMIN — HYDROMORPHONE HYDROCHLORIDE 0.5 MG: 1 INJECTION, SOLUTION INTRAMUSCULAR; INTRAVENOUS; SUBCUTANEOUS at 02:56

## 2022-07-29 RX ADMIN — HYDROMORPHONE HYDROCHLORIDE 0.5 MG: 1 INJECTION, SOLUTION INTRAMUSCULAR; INTRAVENOUS; SUBCUTANEOUS at 16:09

## 2022-07-29 RX ADMIN — HYDROMORPHONE HYDROCHLORIDE 0.5 MG: 1 INJECTION, SOLUTION INTRAMUSCULAR; INTRAVENOUS; SUBCUTANEOUS at 05:37

## 2022-07-29 RX ADMIN — HYDROMORPHONE HYDROCHLORIDE 1 MG: 1 INJECTION, SOLUTION INTRAMUSCULAR; INTRAVENOUS; SUBCUTANEOUS at 08:06

## 2022-07-29 RX ADMIN — DOCUSATE SODIUM 100 MG: 100 CAPSULE, LIQUID FILLED ORAL at 17:11

## 2022-07-29 RX ADMIN — DOCUSATE SODIUM 100 MG: 100 CAPSULE, LIQUID FILLED ORAL at 09:12

## 2022-07-29 NOTE — ED NOTES
Pt ambulated to the bathroom with steady gait  States unable to void        Yudy Sutherland RN  07/29/22 [id number]

## 2022-07-29 NOTE — ED PROVIDER NOTES
History  Chief Complaint   Patient presents with    Vomiting     Vomiting for 3 days  C/o RLQ/LLQ pain  Has not been able to keep anything down     Lamar Morataya is a 50 y o  female with a pertinent past medical history of diverticulitis, anxiety presenting today with right-sided flank pain that began 3 days ago and has been getting progressively worse prompting the ED visit  Associated nausea vomiting  Patient has had decreased urine output for the past 3 days  Denies any dysuria no vaginal bleeding or discharge  Diffuse lower abdominal pain  Patient with no fevers or chills at home  Patient has never had kidney stones in the past   No further complaints at this time  Prior to Admission Medications   Prescriptions Last Dose Informant Patient Reported? Taking?    ALPRAZolam (XANAX) 0 5 mg tablet Not Taking at Unknown time Self Yes No   Sig: Take 0 5 mg by mouth daily as needed for anxiety     Patient not taking: Reported on 7/28/2022   HYDROcodone-acetaminophen (NORCO) 5-325 mg per tablet  Self No No   Sig: Take 1 tablet by mouth every 6 (six) hours as needed (Not relieved by Anti-inflammatory) for up to 12 doses Max Daily Amount: 4 tablets   Patient not taking: Reported on 10/18/2018    lidocaine viscous (XYLOCAINE) 2 % mucosal solution  Self No No   Sig: Apply small amount to the affected area TID PRN   Patient not taking: Reported on 10/18/2018    polyethylene glycol (MIRALAX) 17 g packet  Self No No   Sig: Take 17 g by mouth daily   Patient not taking: Reported on 10/18/2018    pregabalin (LYRICA) 75 mg capsule   No No   Sig: Take 1 capsule by mouth 2 (two) times a day for 10 days   senna (SENOKOT) 8 6 mg  Self No No   Sig: Take 1 tablet (8 6 mg total) by mouth 2 (two) times a day   Patient not taking: Reported on 10/18/2018       Facility-Administered Medications: None       Past Medical History:   Diagnosis Date    SWETA (acute kidney injury) ([address] ) 7/29/2022    Anxiety     Atrial septal defect     Diverticulitis of colon     History of shingles     Mitral valve prolapse        Past Surgical History:   Procedure Laterality Date    APPENDECTOMY       SECTION      CHOLECYSTECTOMY      HYSTERECTOMY      CO COLONOSCOPY FLX DX W/COLLJ SPEC WHEN PFRMD N/A 2018    Procedure: COLONOSCOPY;  Surgeon: Becki Gutierrez MD;  Location: [facility]; Service: Gastroenterology    SIGMOID RESECTION / RECTOPEXY      TUBAL LIGATION         Family History   Adopted: Yes   Problem Relation Age of Onset    No Known Problems Mother     No Known Problems Father      I have reviewed and agree with the history as documented  E-Cigarette/Vaping     E-Cigarette/Vaping Substances     Social History     Tobacco Use    Smoking status: Current Every Day Smoker     Packs/day: 0 50     Types: Cigarettes    Smokeless tobacco: Never Used   Substance Use Topics    Alcohol use: No    Drug use: No       Review of Systems   Constitutional: Negative for chills and fever  HENT: Negative for ear pain and sore throat  Eyes: Negative for pain and visual disturbance  Respiratory: Negative for cough and shortness of breath  Cardiovascular: Negative for chest pain and palpitations  Gastrointestinal: Positive for abdominal pain  Negative for vomiting  Genitourinary: Positive for difficulty urinating and flank pain  Negative for dysuria and hematuria  Musculoskeletal: Negative for arthralgias and back pain  Skin: Negative for color change and rash  Neurological: Negative for seizures and syncope  All other systems reviewed and are negative  Physical Exam  Physical Exam  Vitals reviewed  Constitutional:       General: She is not in acute distress  Appearance: Normal appearance  She is not ill-appearing  HENT:      Head: Normocephalic and atraumatic        Right Ear: Tympanic membrane and external ear normal       Left Ear: Tympanic membrane and external ear normal       Nose: Nose normal  No congestion or rhinorrhea  Mouth/Throat:      Mouth: Mucous membranes are moist       Pharynx: Oropharynx is clear  No oropharyngeal exudate or posterior oropharyngeal erythema  Eyes:      Extraocular Movements: Extraocular movements intact  Conjunctiva/sclera: Conjunctivae normal       Pupils: Pupils are equal, round, and reactive to light  Cardiovascular:      Rate and Rhythm: Normal rate and regular rhythm  Pulses: Normal pulses  Heart sounds: Normal heart sounds  Pulmonary:      Effort: Pulmonary effort is normal  No respiratory distress  Breath sounds: Normal breath sounds  No wheezing  Abdominal:      General: Abdomen is flat  Bowel sounds are normal  There is no distension  Palpations: Abdomen is soft  There is no mass  Tenderness: There is abdominal tenderness (Diffuse lower abdominal )  There is right CVA tenderness  There is no left CVA tenderness or guarding  Hernia: No hernia is present  Musculoskeletal:         General: No swelling, tenderness or deformity  Normal range of motion  Cervical back: Normal range of motion and neck supple  No rigidity or tenderness  Right lower leg: No edema  Left lower leg: No edema  Skin:     General: Skin is warm and dry  Capillary Refill: Capillary refill takes less than 2 seconds  Coloration: Skin is not jaundiced  Findings: No erythema or rash  Neurological:      General: No focal deficit present  Mental Status: She is alert and oriented to person, place, and time  Cranial Nerves: No cranial nerve deficit  Sensory: No sensory deficit  Motor: No weakness        Coordination: Coordination normal       Gait: Gait normal       Deep Tendon Reflexes: Reflexes normal          Vital Signs  ED Triage Vitals [07/28/22 2108]   Temperature Pulse Respirations Blood Pressure SpO2   98 6 °F (37 °C) (!) 112 22 125/56 97 %      Temp Source Heart Rate Source Patient Position - Orthostatic VS BP Location FiO2 (%)   Oral Monitor Sitting Left arm --      Pain Score       10 - Worst Possible Pain           Vitals:    07/29/22 0200 07/29/22 0245 07/29/22 0600 07/29/22 0630   BP: 137/72 138/76 120/64 117/77   Pulse: 70 68 66 69   Patient Position - Orthostatic VS: Lying Lying Lying Lying         Visual Acuity      ED Medications  Medications   multi-electrolyte (PLASMALYTE-A/ISOLYTE-S PH 7 4) IV solution (100 mL/hr Intravenous New Bag 7/29/22 0632)   HYDROmorphone (DILAUDID) injection 0 5 mg (has no administration in time range)   HYDROmorphone (DILAUDID) injection 1 mg (has no administration in time range)   oxyCODONE (ROXICODONE) IR tablet 5 mg (has no administration in time range)   tamsulosin (FLOMAX) capsule 0 4 mg (has no administration in time range)   potassium chloride (K-DUR,KLOR-CON) CR tablet 40 mEq (40 mEq Oral Given 7/28/22 2205)   sodium chloride 0 9 % bolus 1,000 mL (0 mL Intravenous Stopped 7/28/22 2328)   morphine injection 2 mg (2 mg Intravenous Given 7/28/22 2229)   iohexol (OMNIPAQUE) 350 MG/ML injection (MULTI-DOSE) 70 mL (70 mL Intravenous Given 7/28/22 2240)   HYDROmorphone (DILAUDID) injection 1 mg (1 mg Intravenous Given 7/29/22 0050)   cefTRIAXone (ROCEPHIN) IVPB (premix in dextrose) 1,000 mg 50 mL (0 mg Intravenous Stopped 7/29/22 0211)   HYDROmorphone (DILAUDID) injection 0 5 mg (0 5 mg Intravenous Given 7/29/22 0256)   HYDROmorphone (DILAUDID) injection 0 5 mg (0 5 mg Intravenous Given 7/29/22 0537)   tamsulosin (FLOMAX) capsule 0 4 mg (0 4 mg Oral Given 7/29/22 0631)       Diagnostic Studies  Results Reviewed     Procedure Component Value Units Date/Time    Basic metabolic panel [[id number]]  (Abnormal) Collected: 07/29/22 0633    Lab Status: Final result Specimen: Blood from Arm, Right Updated: 07/29/22 0718     Sodium 142 mmol/L      Potassium 3 5 mmol/L      Chloride 107 mmol/L      CO2 25 mmol/L      ANION GAP 10 mmol/L      BUN 23 mg/dL      Creatinine 1 32 mg/dL      Glucose 108 mg/dL      Calcium 8 7 mg/dL      eGFR 47 ml/min/1 73sq m     Narrative:      [place] guidelines for Chronic Kidney Disease (CKD):     Stage 1 with normal or high GFR (GFR > 90 mL/min/1 73 square meters)    Stage 2 Mild CKD (GFR = 60-89 mL/min/1 73 square meters)    Stage 3A Moderate CKD (GFR = 45-59 mL/min/1 73 square meters)    Stage 3B Moderate CKD (GFR = 30-44 mL/min/1 73 square meters)    Stage 4 Severe CKD (GFR = 15-29 mL/min/1 73 square meters)    Stage 5 End Stage CKD (GFR <15 mL/min/1 73 square meters)  Note: GFR calculation is accurate only with a steady state creatinine    CBC and differential [[id number]]     Lab Status: No result Specimen: Blood     Basic metabolic panel [[id number]]     Lab Status: No result Specimen: Blood     Urine Microscopic [[id number]]  (Abnormal) Collected: 07/29/22 0530    Lab Status: Final result Specimen: Urine, Clean Catch Updated: 07/29/22 0559     RBC, UA 4-10 /hpf      WBC, UA 1-2 /hpf      Epithelial Cells Occasional /hpf      Bacteria, UA Occasional /hpf     UA w Reflex to Microscopic w Reflex to Culture [[id number]]  (Abnormal) Collected: 07/29/22 0530    Lab Status: Final result Specimen: Urine, Clean Catch Updated: 07/29/22 0555     Color, UA Yellow     Clarity, UA Clear     Specific [place], UA 1 020     pH, UA 6 0     Leukocytes, UA Negative     Nitrite, UA Negative     Protein, UA Negative mg/dl      Glucose, UA Negative mg/dl      Ketones, UA Negative mg/dl      Urobilinogen, UA 0 2 E U /dl      Bilirubin, UA Large     Occult Blood, UA Moderate    FLU/RSV/COVID - if FLU/RSV clinically relevant [[id number]]  (Normal) Collected: 07/28/22 2230    Lab Status: Final result Specimen: Nasopharyngeal Swab Updated: 07/28/22 2315     SARS-CoV-2 Negative     INFLUENZA A PCR Negative     INFLUENZA B PCR Negative     RSV PCR Negative    Narrative:      FOR PEDIATRIC PATIENTS - copy/paste COVID Guidelines URL to browser: [email or web address]    SARS-CoV-2 assay is a Nucleic Acid Amplification assay intended for the  qualitative detection of nucleic acid from SARS-CoV-2 in nasopharyngeal  swabs  Results are for the presumptive identification of SARS-CoV-2 RNA  Positive results are indicative of infection with SARS-CoV-2, the virus  causing COVID-19, but do not rule out bacterial infection or co-infection  with other viruses  Laboratories within the [place] and its  territories are required to report all positive results to the appropriate  public health authorities  Negative results do not preclude SARS-CoV-2  infection and should not be used as the sole basis for treatment or other  patient management decisions  Negative results must be combined with  clinical observations, patient history, and epidemiological information  This test has not been FDA cleared or approved  This test has been authorized by FDA under an Emergency Use Authorization  (EUA)  This test is only authorized for the duration of time the  declaration that circumstances exist justifying the authorization of the  emergency use of an in vitro diagnostic tests for detection of SARS-CoV-2  virus and/or diagnosis of COVID-19 infection under section 564(b)(1) of  the Act, 21 U  S C  000YXH-5(A)(8), unless the authorization is terminated  or revoked sooner  The test has been validated but independent review by FDA  and CLIA is pending  Test performed using [employer] GeneXpert: This RT-PCR assay targets N2,  a region unique to SARS-CoV-2  A conserved region in the E-gene was chosen  for pan-Sarbecovirus detection which includes SARS-CoV-2  Lactic acid [[id number]]  (Normal) Collected: 07/28/22 2224    Lab Status: Final result Specimen: Blood from Arm, Left Updated: 07/28/22 2254     LACTIC ACID 1 0 mmol/L     Narrative:      Result may be elevated if tourniquet was used during collection      Protime-INR [[id number]]  (Normal) Collected: 07/28/22 2224    Lab Status: Final result Specimen: Blood from Arm, Left Updated: 07/28/22 2247     Protime 13 2 seconds      INR 1 02    APTT [[id number]]  (Normal) Collected: 07/28/22 2224    Lab Status: Final result Specimen: Blood from Arm, Left Updated: 07/28/22 2247     PTT 28 seconds     Blood culture [[id number]] Collected: 07/28/22 2224    Lab Status: In process Specimen: Blood from Arm, Right Updated: 07/28/22 2230    Blood culture [[id number]] Collected: 07/28/22 2224    Lab Status:  In process Specimen: Blood from Arm, Left Updated: 07/28/22 2230    Magnesium [[id number]]  (Normal) Collected: 07/28/22 2113    Lab Status: Final result Specimen: Blood from Arm, Left Updated: 07/28/22 2158     Magnesium 2 1 mg/dL     Comprehensive metabolic panel [[id number]]  (Abnormal) Collected: 07/28/22 2113    Lab Status: Final result Specimen: Blood from Arm, Left Updated: 07/28/22 2136     Sodium 139 mmol/L      Potassium 3 1 mmol/L      Chloride 101 mmol/L      CO2 23 mmol/L      ANION GAP 15 mmol/L      BUN 29 mg/dL      Creatinine 1 72 mg/dL      Glucose 104 mg/dL      Calcium 9 9 mg/dL      AST 19 U/L      ALT 32 U/L      Alkaline Phosphatase 50 U/L      Total Protein 8 7 g/dL      Albumin 4 3 g/dL      Total Bilirubin 0 88 mg/dL      eGFR 34 ml/min/1 73sq m     Narrative:      [place] guidelines for Chronic Kidney Disease (CKD):     Stage 1 with normal or high GFR (GFR > 90 mL/min/1 73 square meters)    Stage 2 Mild CKD (GFR = 60-89 mL/min/1 73 square meters)    Stage 3A Moderate CKD (GFR = 45-59 mL/min/1 73 square meters)    Stage 3B Moderate CKD (GFR = 30-44 mL/min/1 73 square meters)    Stage 4 Severe CKD (GFR = 15-29 mL/min/1 73 square meters)    Stage 5 End Stage CKD (GFR <15 mL/min/1 73 square meters)  Note: GFR calculation is accurate only with a steady state creatinine    Lipase [[id number]]  (Normal) Collected: 07/28/22 2113    Lab Status: Final result Specimen: Blood from Arm, Left Updated: 07/28/22 2136     Lipase 326 u/L     CBC and differential [[id number]]  (Abnormal) Collected: 07/28/22 2113    Lab Status: Final result Specimen: Blood from Arm, Left Updated: 07/28/22 2118     WBC 13 09 Thousand/uL      RBC 5 06 Million/uL      Hemoglobin 15 5 g/dL      Hematocrit 44 5 %      MCV 88 fL      MCH 30 6 pg      MCHC 34 8 g/dL      RDW 13 0 %      MPV 10 4 fL      Platelets 191 Thousands/uL      nRBC 0 /100 WBCs      Neutrophils Relative 76 %      Immat GRANS % 1 %      Lymphocytes Relative 14 %      Monocytes Relative 9 %      Eosinophils Relative 0 %      Basophils Relative 0 %      Neutrophils Absolute 9 90 Thousands/µL      Immature Grans Absolute 0 09 Thousand/uL      Lymphocytes Absolute 1 81 Thousands/µL      Monocytes Absolute 1 23 Thousand/µL      Eosinophils Absolute 0 01 Thousand/µL      Basophils Absolute 0 05 Thousands/µL                  CT abdomen pelvis w contrast   Final Result by Tova Iqbal MD (07/29 0022)      Mild right hydronephrosis and proximal hydroureter  Tandem obstructing calculi in the right ureter measuring 3 and 4 mm  Workstation performed: [id number]                    Procedures  Procedures         ED Course  ED Course as of 07/29/22 0735   Fri Jul 29, 2022   0044 Gordon text to Urology, Jasmyne Andrew I discussed with Urology, Shamika Mejia I discussed the findings regarding labs and imaging  She is advising transfer to [address] [place] [address] Internal Medicine and to make the patient NPO  She recommended giving ceftriaxone 1 g  [place] since no UA could be obtained due to decreased urine output  0114 Discussed with transfer center who reports there are no beds at [facility]  They report that they will arrange for transfer as soon as possible  I discussed the plan of care with the patient at bedside, she demonstrated understanding and is agreeable    On re-evaluation her pain is well controlled at this time  Will start antibiotics now  SBIRT 22yo+    Flowsheet Row Most Recent Value   SBIRT (25 yo +)    In order to provide better care to our patients, we are screening all of our patients for alcohol and drug use  Would it be okay to ask you these screening questions? Yes Filed at: 07/28/2022 2324   Initial Alcohol Screen: US AUDIT-C     1  How often do you have a drink containing alcohol? 0 Filed at: 07/28/2022 2324   2  How many drinks containing alcohol do you have on a typical day you are drinking? 0 Filed at: 07/28/2022 2324   3a  Male UNDER 65: How often do you have five or more drinks on one occasion? 0 Filed at: 07/28/2022 2324   Audit-C Score 0 Filed at: 07/28/2022 2324   KATINA: How many times in the past year have you    Used an illegal drug or used a prescription medication for non-medical reasons? Never Filed at: 07/28/2022 2324                    MDM  Number of Diagnoses or Management Options  Kidney stone on right side: new and requires workup  Nausea and vomiting, unspecified vomiting type: new and requires workup  Diagnosis management comments: Arthur Morton is a 50 y o  female Slightly tachycardic upon initial evaluation, normalized now  Labs revealed a mild leukocytosis of 13 09, significant SWETA with a creatinine of 1 72, BUN of 29 EGFR 34  Imaging revealed as follows, "Mild right hydronephrosis and proximal hydroureter  Tandem obstructing calculi in the right ureter measuring 3 and 4 mm "    I discussed with Urology advanced practitioner overnight recommended transfer  Transfer was initiated, however [facility] did not have any beds to accept the patient  I discussed this with patient at bedside  She demonstrated understanding is in agreeable to plan    Discussed with morning physician assistant who received this patient sign-out, Renetta Mo PA-C was recommending Urology consultation here in the emergency department to determine if needed transfer  Consult placed  Urology contacted  Amount and/or Complexity of Data Reviewed  Clinical lab tests: ordered and reviewed  Tests in the radiology section of CPT®: ordered and reviewed  Tests in the medicine section of CPT®: ordered and reviewed  Review and summarize past medical records: yes  Discuss the patient with other providers: yes  Independent visualization of images, tracings, or specimens: yes    Risk of Complications, Morbidity, and/or Mortality  Presenting problems: high  Diagnostic procedures: moderate  Management options: moderate    Patient Progress  Patient progress: stable      Disposition  Final diagnoses:   Kidney stone on right side   Nausea and vomiting, unspecified vomiting type     Time reflects when diagnosis was documented in both MDM as applicable and the Disposition within this note     Time User Action Codes Description Comment    7/29/2022 12:59 AM Vikki Whitaker Add [N20 0] Kidney stone on right side     7/29/2022 12:59 AM Uriel Mackey Add [R11 2] Nausea and vomiting, unspecified vomiting type       ED Disposition     ED Disposition   Transfer to Another Facility-In Network    Condition   --    Date/Time   Fri Jul 29, 2022 [address] should be transferred out to [facility] MD Documentation    Srikanth Beebe Most Recent Value   Patient Condition The patient has been stabilized such that within reasonable medical probability, no material deterioration of the patient condition or the condition of the unborn child(julieta) is likely to result from the transfer   Reason for Transfer Level of Care needed not available at this facility, Other (Include comment)____________________  [Urology]   Benefits of Transfer Specialized equipment and/or services available at the receiving facility (Include comment)________________________  [Urology]   Risks of Transfer Potential for delay in receiving treatment, Potential deterioration of medical condition, Loss of IV, Increased discomfort during transfer, Possible worsening of condition or death during transfer   Accepting Physician Dr Autumn Andrews   Provider Certification General risk, such as traffic hazards, adverse weather conditions, rough terrain or turbulence, possible failure of equipment (including vehicle or aircraft), or consequences of actions of persons outside the control of the transport personnel, Unanticipated needs of medical equipment and personnel during transport, Risk of worsening condition, The possibility of a transport vehicle being unavailable      Follow-up Information    None         Patient's Medications   Discharge Prescriptions    No medications on file       No discharge procedures on file      PDMP Review     None          ED Provider  Electronically Signed by           Nolvia iRvera PA-C  07/29/22 4255

## 2022-07-29 NOTE — EMTALA/ACUTE CARE TRANSFER
[address] [facility] 20  [address] [facility] [address]  Dept: [phone]      EMTALA TRANSFER CONSENT    NAME Bety Mathews                                         1975                              MRN [id number]    I have been informed of my rights regarding examination, treatment, and transfer   by Dr Jo Ann Mathew MD    Benefits: Specialized equipment and/or services available at the receiving facility (Include comment)________________________ (Urology)    Risks: Potential for delay in receiving treatment, Potential deterioration of medical condition, Loss of IV, Increased discomfort during transfer, Possible worsening of condition or death during transfer      Consent for Transfer:  I acknowledge that my medical condition has been evaluated and explained to me by the emergency department physician or other qualified medical person and/or my attending physician, who has recommended that I be transferred to the service of  Accepting Physician: Dr Geovanna Gold at    The above potential benefits of such transfer, the potential risks associated with such transfer, and the probable risks of not being transferred have been explained to me, and I fully understand them  The doctor has explained that, in my case, the benefits of transfer outweigh the risks  I agree to be transferred  I authorize the performance of emergency medical procedures and treatments upon me in both transit and upon arrival at the receiving facility  Additionally, I authorize the release of any and all medical records to the receiving facility and request they be transported with me, if possible  I understand that the safest mode of transportation during a medical emergency is an ambulance and that the Hospital advocates the use of this mode of transport   Risks of traveling to the receiving facility by car, including absence of medical control, life sustaining equipment, such as oxygen, and medical personnel has been explained to me and I fully understand them  (LIZA CORRECT BOX BELOW)  [  ]  I consent to the stated transfer and to be transported by ambulance/helicopter  [  ]  I consent to the stated transfer, but refuse transportation by ambulance and accept full responsibility for my transportation by car  I understand the risks of non-ambulance transfers and I exonerate the Hospital and its staff from any deterioration in my condition that results from this refusal     X___________________________________________    DATE  22  TIME________  Signature of patient or legally responsible individual signing on patient behalf           RELATIONSHIP TO PATIENT_________________________          Provider Certification    NAME Shena Freedman                                         1975                              MRN [id number]    A medical screening exam was performed on the above named patient  Based on the examination:    Condition Necessitating Transfer The primary encounter diagnosis was Kidney stone on right side  A diagnosis of Nausea and vomiting, unspecified vomiting type was also pertinent to this visit      Patient Condition: The patient has been stabilized such that within reasonable medical probability, no material deterioration of the patient condition or the condition of the unborn child(julieta) is likely to result from the transfer    Reason for Transfer: Level of Care needed not available at this facility, Other (Include comment)____________________ (Urology)    Transfer Requirements: Facility     · Space available and qualified personnel available for treatment as acknowledged by    · Agreed to accept transfer and to provide appropriate medical treatment as acknowledged by       Dr Yahaira Barrow  · Appropriate medical records of the examination and treatment of the patient are provided at the time of transfer   [address] _______  · Transfer will be performed by qualified personnel from    and appropriate transfer equipment as required, including the use of necessary and appropriate life support measures  Provider Certification: I have examined the patient and explained the following risks and benefits of being transferred/refusing transfer to the patient/family:  General risk, such as traffic hazards, adverse weather conditions, rough terrain or turbulence, possible failure of equipment (including vehicle or aircraft), or consequences of actions of persons outside the control of the transport personnel, Unanticipated needs of medical equipment and personnel during transport, Risk of worsening condition, The possibility of a transport vehicle being unavailable      Based on these reasonable risks and benefits to the patient and/or the unborn child(julieta), and based upon the information available at the time of the patients examination, I certify that the medical benefits reasonably to be expected from the provision of appropriate medical treatments at another medical facility outweigh the increasing risks, if any, to the individuals medical condition, and in the case of labor to the unborn child, from effecting the transfer      X____________________________________________ DATE 07/29/22        TIME_______      ORIGINAL - SEND TO MEDICAL RECORDS   COPY - SEND WITH PATIENT DURING TRANSFER

## 2022-07-29 NOTE — CONSULTS
[address] 1975, 50 y o  female MRN: [id number]  Unit/Bed#: FT 01 Encounter: [id number]  Primary Care Provider: Valentin Peter MD   Date and time admitted to hospital: 7/28/2022  9:37 PM    Consults    Electrolyte abnormality  Assessment & Plan  · Hypokalemic on arrival  · Now resolved with supplementation  · Monitor     SWETA (acute kidney injury) ([facility] [address] )  Assessment & Plan  · Likely pre renal in setting of hypovolemia  · Started on IV fluids in ED, continue     Ureterolithiasis  Assessment & Plan  · Noted on CT imaging mild right hydroureteronephrosis with 3-4 mm obstructing renal calculi in in right ureter  · Urology consulted, recommending transfer to [facility] for further management  · Noted to have SWETA, see plan below  · UA negative, given dose of IV ceftriaxone in ED  · Continue flomax, fluids, pain meds, keep NPO  · Transfer to [facility]      VTE Prophylaxis: VTE Score: 2 Low Risk (Score 0-2) - Encourage Ambulation  Recommendations for Discharge:  · Transfer to [facility]    Counseling / Coordination of Care Time: 30 minutes Greater than 50% of total time spent on patient counseling and coordination of care  History of Present Illness:  Mariely Escobar is a 50 y o  female who is originally admitted to the [facility] internal medicine service due to obstructing renal stone  We are consulted for medical management  Review of Systems:  Review of Systems   Constitutional: Positive for activity change and fatigue  HENT: Negative for ear pain and sore throat  Eyes: Negative for pain and visual disturbance  Respiratory: Negative for cough and shortness of breath  Cardiovascular: Negative for chest pain and palpitations  Gastrointestinal: Positive for abdominal pain and nausea  Genitourinary: Positive for difficulty urinating and flank pain  Musculoskeletal: Negative for arthralgias and back pain  Skin: Negative for color change and rash     Neurological: Negative for seizures and syncope  All other systems reviewed and are negative  Past Medical and Surgical History:   Past Medical History:   Diagnosis Date    SWETA [facility]acute [facility] injury) ([facility] [address] ) 2022    Anxiety     Atrial septal defect     Diverticulitis of colon     History of shingles     Mitral valve prolapse        Past Surgical History:   Procedure Laterality Date    APPENDECTOMY       SECTION      CHOLECYSTECTOMY      HYSTERECTOMY      HI COLONOSCOPY FLX DX W/COLLJ SPEC WHEN PFRMD N/A 2018    Procedure: COLONOSCOPY;  Surgeon: Noni Shanks MD;  Location: [facility]; Service: Gastroenterology    SIGMOID RESECTION / RECTOPEXY      TUBAL LIGATION         Meds/Allergies:  all medications and allergies reviewed    Allergies: No Known Allergies    Social History:  Marital Status: /Civil Union  Substance Use History:   Social History     Substance and Sexual Activity   Alcohol Use No     Social History     Tobacco Use   Smoking Status Current Every Day Smoker    Packs/day: 0 50    Types: Cigarettes   Smokeless Tobacco Never Used     Social History     Substance and Sexual Activity   Drug Use No       Family History:  Family History   Adopted: Yes   Problem Relation Age of Onset    No Known Problems Mother     No Known Problems Father        Physical Exam:   Vitals:   Blood Pressure: 117/77 (22)  Pulse: 69 (22)  Temperature: 98 6 °F (37 °C) (22)  Temp Source: Oral (22)  Respirations: 18 (22)  Height: 5' 1" (154 9 cm) (22)  Weight - Scale: 79 4 kg (175 lb) (22)  SpO2: 94 % (22)    Physical Exam  Constitutional:       General: She is not in acute distress  Appearance: Normal appearance  She is not toxic-appearing  Cardiovascular:      Rate and Rhythm: Normal rate and regular rhythm  Heart sounds: Normal heart sounds  No murmur heard    Pulmonary:      Effort: Pulmonary effort is normal  No respiratory distress  Breath sounds: Normal breath sounds  No wheezing  Abdominal:      General: Abdomen is flat  There is no distension  Palpations: Abdomen is soft  Tenderness: There is abdominal tenderness  Neurological:      General: No focal deficit present  Mental Status: She is alert and oriented to person, place, and time  Mental status is at baseline  Motor: No weakness  Additional Data:   Lab Results:    Results from last 7 days   Lab Units 07/28/22  2113   WBC Thousand/uL 13 09*   HEMOGLOBIN g/dL 15 5*   HEMATOCRIT % 44 5   PLATELETS Thousands/uL 320   NEUTROS PCT % 76*   LYMPHS PCT % 14   MONOS PCT % 9   EOS PCT % 0     Results from last 7 days   Lab Units 07/29/22  0633 07/28/22  2113   SODIUM mmol/L 142 139   POTASSIUM mmol/L 3 5 3 1*   CHLORIDE mmol/L 107 101   CO2 mmol/L 25 23   BUN mg/dL 23 29*   CREATININE mg/dL 1 32* 1 72*   ANION GAP mmol/L 10 15*   CALCIUM mg/dL 8 7 9 9   ALBUMIN g/dL  --  4 3   TOTAL BILIRUBIN mg/dL  --  0 88   ALK PHOS U/L  --  50   ALT U/L  --  32   AST U/L  --  19   GLUCOSE RANDOM mg/dL 108 104     Results from last 7 days   Lab Units 07/28/22  2224   INR  1 02         Lab Results   Component Value Date/Time    HGBA1C 5 4 01/27/2022 07:03 AM         Results from last 7 days   Lab Units 07/28/22  2224   LACTIC ACID mmol/L 1 0       Imaging: Reviewed radiology reports from this admission including: abdominal/pelvic CT  CT abdomen pelvis w contrast   Final Result by Johanna Lanier MD (07/29 0022)      Mild right hydronephrosis and proximal hydroureter  Tandem obstructing calculi in the right ureter measuring 3 and 4 mm  Workstation performed: [id number]             EKG, Pathology, and Other Studies Reviewed on Admission:   · EKG: No EKG obtained  ** Please Note: This note may have been constructed using a voice recognition system   **

## 2022-07-29 NOTE — ED NOTES
Dr Usha Nicolas at the bedside     [facility], [facility]  07/29/22 5609

## 2022-07-29 NOTE — ASSESSMENT & PLAN NOTE
· Noted on CT imaging mild right hydroureteronephrosis with 3-4 mm obstructing renal calculi in in right ureter  · Urology consulted, recommending transfer to [facility] for further management  · Noted to have SWETA, see plan below  · UA negative, given dose of IV ceftriaxone in ED  · Continue flomax, fluids, pain meds, keep NPO  · Transfer to [facility]

## 2022-07-29 NOTE — CONSULTS
CONSULT    Patient Name: Zoila Joel  Patient MRN: [id number]  Date of Service: 7/29/2022   Date / Time Note Created: 7/29/2022 8:59 AM   Referring Provider: Maria E Coffman DO    Provider Creating Note: Ada Escobar    PCP: Steven Rosas  Attending Provider:  Maria E Coffman DO    Reason for Consult: Flank Pain    HPI:  Zoila Joel is a 64-year-old female without prior  history presenting to [facility] emergency room with right flank pain, nausea vomiting not accompanied by fever, chills, dysuria gross hematuria  CT of the abdomen and pelvis demonstrated right hydronephrosis secondary to tandem 3 and 4 mm ureteral calculi  Patient is afebrile, normotensive but positively symptomatic  Presenting serum creatinine 1 7  This is trended downward to 1 3 with IV fluids  Urinalysis negative for WBCs and bacteria microscopic  WBC count 10 7  Urologic consultation requested for further management recommendations  Active Problems:    Patient Active Problem List   Diagnosis    Zoster    Headache    Neuropathy    Flank pain    Constipation    Rectal bleed    Dependence on nicotine from cigarettes    Diverticulitis    Ureterolithiasis    SWETA (acute kidney injury) ([facility] [address] )    Electrolyte abnormality            Impressions  · Right ureteral calculi--tandem 3 and 4 mm  · Hydronephrosis  · Renal Colic    Recommendations  1  Initiate aggressive IVFs   2  Flomax  3  Analgesia/Narcotics   4  Anti-emetics   5  ATBs empirically while awaiting culture   6  Strain urine   7  NPO for OR if no spontaneous expulsion achieved  This will necessitate transfer to [facility] were all urologic procedures or centralized over the weekend  Explained risk, benefits and potential complications of ureteroscopic stone extraction   Patient has verbalized understanding of possible need for ureteral stent only for any signs of infection and/or technical difficult prohibiting stone retrieval etc ; requiring staged ureteroscopy electively once recovered and infection free as OP  Formal consent by surgeon  Past Medical History:   Diagnosis Date    SWETA [facility] kidney injury) ([facility] [address] ) 2022    Anxiety     Atrial septal defect     Diverticulitis of colon     History of shingles     Mitral valve prolapse        Past Surgical History:   Procedure Laterality Date    APPENDECTOMY       SECTION      CHOLECYSTECTOMY      HYSTERECTOMY      NH COLONOSCOPY FLX DX W/COLLJ SPEC WHEN PFRMD N/A 2018    Procedure: COLONOSCOPY;  Surgeon: Belinda Pereira MD;  Location: [facility];   Service: Gastroenterology    SIGMOID RESECTION / RECTOPEXY      TUBAL LIGATION         Family History   Adopted: Yes   Problem Relation Age of Onset    No Known Problems Mother     No Known Problems Father        Social History     Socioeconomic History    Marital status: /Civil Union     Spouse name: Not on file    Number of children: Not on file    Years of education: Not on file    Highest education level: Not on file   Occupational History    Not on file   Tobacco Use    Smoking status: Current Every Day Smoker     Packs/day: 0 50     Types: Cigarettes    Smokeless tobacco: Never Used   Substance and Sexual Activity    Alcohol use: No    Drug use: No    Sexual activity: Not on file   Other Topics Concern    Not on file   Social History Narrative    Not on file     Social Determinants of Health     Financial Resource Strain: Not on file   Food Insecurity: Not on file   Transportation Needs: Not on file   Physical Activity: Not on file   Stress: Not on file   Social Connections: Not on file   Intimate Partner Violence: Not on file   Housing Stability: Not on file       No Known Allergies    Review of Systems  10 point review of systems negative except as noted in HPI  Constitutional:   negative for - chills or fever  Cardiovascular:   no chest pain or dyspnea on exertion  Gastrointestinal:   positive for - abdominal pain and nausea/vomiting  Genito-Urinary:   no dysuria, trouble voiding, or hematuria  Neurological:   no TIA or stroke symptoms     Chart Review   Allergies, current medications, history, problem list    Vital Signs  /77 (BP Location: Right arm)   Pulse 69   Temp 98 6 °F (37 °C) (Oral)   Resp 18   Ht 5' 1" (1 549 m)   Wt 79 4 kg (175 lb)   LMP 11/05/2006   SpO2 94%   BMI 33 07 kg/m²     Physical Exam  General appearance: alert and oriented, in no acute distress, appears stated age, cooperative and mild distress  Head: Normocephalic, without obvious abnormality, atraumatic  Neck: no adenopathy, no carotid bruit, no JVD, supple, symmetrical, trachea midline and thyroid not enlarged, symmetric, no tenderness/mass/nodules  Lungs: diminished breath sounds  Heart: regular rate and rhythm, S1, S2 normal, no murmur, click, rub or gallop  Abdomen: abnormal findings:  moderate tenderness in the RLQ and in the lower abdomen  Extremities: extremities normal, warm and well-perfused; no cyanosis, clubbing, or edema  Pulses: 2+ and symmetric  Neurologic: Grossly normal  No urinary drains     Laboratory Studies  Lab Results   Component Value Date    HGBA1C 5 4 01/27/2022    K 3 5 07/29/2022     07/29/2022    CO2 25 07/29/2022    CREATININE 1 32 (H) 07/29/2022    BUN 23 07/29/2022    MG 2 1 07/28/2022    PHOS 4 1 09/08/2018     Lab Results   Component Value Date    WBC 10 71 (H) 07/29/2022    RBC 4 34 07/29/2022    HGB 13 4 07/29/2022    HCT 39 2 07/29/2022    MCV 90 07/29/2022    MCH 30 9 07/29/2022    RDW 13 1 07/29/2022     07/29/2022         Imaging and Other Studies  )  CT abdomen pelvis w contrast    Result Date: 7/29/2022  Narrative: CT ABDOMEN AND PELVIS WITH IV CONTRAST INDICATION:   Lower abdominal and flank pain    COMPARISON:  None  TECHNIQUE:  CT examination of the abdomen and pelvis was performed   Axial, sagittal, and coronal 2D reformatted images were created from the source data and submitted for interpretation  Radiation dose length product (DLP) for this visit:  677 mGy-cm   This examination, like all CT scans performed in the [facility], was performed utilizing techniques to minimize radiation dose exposure, including the use of iterative reconstruction and automated exposure control  IV Contrast:  70 mL of iohexol (OMNIPAQUE) Enteric Contrast:  Enteric contrast was not administered  FINDINGS: ABDOMEN LOWER CHEST:  Clear lung bases  LIVER/BILIARY TREE:  Hepatic steatosis and hepatomegaly :  Subcentimeter hypoattenuating lesions, too small to characterize, though likely to represent cysts  GALLBLADDER:  Cholecystectomy  SPLEEN:  Unremarkable  PANCREAS:  Unremarkable  ADRENAL GLANDS:  Unremarkable  Mildly delayed right nephrogram and enlargement of the right kidney  Mild to moderate right hydronephrosis and mild proximal hydroureter  Obstructing 4 mm calculus in the right ureter at the L4 level     Second, more distal calculus in the right ureter at the L4-5 level measuring 3 mm  Right renal cortical cysts measuring up to 1 2 cm STOMACH AND BOWEL:  Postsurgical change in the rectosigmoid  Diverticulosis without evidence of diverticulitis or colitis  No bowel obstruction or  APPENDIX:  No findings to suggest appendicitis  ABDOMINOPELVIC CAVITY:  No ascites  No pneumoperitoneum  No lymphadenopathy  VESSELS:  No abdominal aortic aneurysm  PELVIS REPRODUCTIVE ORGANS:  Prior hysterectomy  URINARY BLADDER:  Limited evaluation of the collapsed bladder    ABDOMINAL WALL/INGUINAL REGIONS:  Unremarkable  OSSEOUS STRUCTURES:  L5-S1 disc and facet osteophytosis  Moderate to severe right neural foraminal narrowing  Impression: Mild right hydronephrosis and proximal hydroureter  Tandem obstructing calculi in the right ureter measuring 3 and 4 mm   Workstation performed: [id number]       Medications   Current Facility-Administered Medications   Medication Dose Route Frequency Provider Last Rate    docusate sodium  100 mg Oral BID Geneva Miranda MD      HYDROmorphone  0 5 mg Intravenous Q4H PRN Geneva Miranda MD      multi-electrolyte  100 mL/hr Intravenous Continuous Kim Alberto PA-C 100 mL/hr (07/29/22 6316)    ondansetron  4 mg Intravenous Q4H PRN Geneva Miranda MD      oxyCODONE  5 mg Oral Q6H PRN Geneva Miranda MD      senna  1 tablet Oral HS Geneva Miranda MD      Artemio Mullen ON 7/30/2022] tamsulosin  0 4 mg Oral Daily With MD Mary Ann Harrell CRNP

## 2022-07-29 NOTE — ED NOTES
FROM: [address] [facility]   FT 01-FT 01 ([facility] CT SCAN)  TO: Josse Meridairn   --  DX: --SWETA, Hydronephrosis, Uerteral Calculus  EMS Tx Reason: Urology  Transfer Priority Level (1,2,3): 3  Referring: Dai Oquendo PA-C  Accepting: Dr Aislinn Dueñas  Transport (ALS/BLS, etc):  ALS  Reason for ALS/CCT if applicable (O2, tele, IVF, meds): No covid testing, Telem, NSS, Ceftriaxone, Room air  P/U Time:  Number for Report:  [id number] [facility], RN  07/29/22 2050

## 2022-07-30 ENCOUNTER — APPOINTMENT (INPATIENT)
Dept: RADIOLOGY | Facility: HOSPITAL | Age: 47
DRG: 446 | End: 2022-07-30
Payer: COMMERCIAL

## 2022-07-30 ENCOUNTER — TELEPHONE (OUTPATIENT)
Dept: OTHER | Facility: HOSPITAL | Age: 47
End: 2022-07-30

## 2022-07-30 ENCOUNTER — HOSPITAL ENCOUNTER (INPATIENT)
Facility: HOSPITAL | Age: 47
LOS: 1 days | Discharge: HOME | DRG: 446 | End: 2022-07-31
Attending: UROLOGY | Admitting: INTERNAL MEDICINE
Payer: COMMERCIAL

## 2022-07-30 VITALS
DIASTOLIC BLOOD PRESSURE: 58 MMHG | WEIGHT: 175 LBS | BODY MASS INDEX: 33.04 KG/M2 | OXYGEN SATURATION: 98 % | RESPIRATION RATE: 18 BRPM | HEART RATE: 73 BPM | TEMPERATURE: 98.6 F | SYSTOLIC BLOOD PRESSURE: 126 MMHG | HEIGHT: 61 IN

## 2022-07-30 DIAGNOSIS — N20.1 URETEROLITHIASIS: ICD-10-CM

## 2022-07-30 DIAGNOSIS — N20.0 NEPHROLITHIASIS: Primary | ICD-10-CM

## 2022-07-30 DIAGNOSIS — N13.2 HYDRONEPHROSIS WITH URINARY OBSTRUCTION DUE TO URETERAL CALCULUS: Primary | ICD-10-CM

## 2022-07-30 PROBLEM — F32.A DEPRESSION: Status: ACTIVE | Noted: 2022-07-30

## 2022-07-30 PROBLEM — E66.9 OBESITY (BMI 30-39.9): Status: ACTIVE | Noted: 2022-07-30

## 2022-07-30 PROBLEM — F41.9 ANXIETY: Status: ACTIVE | Noted: 2022-07-30

## 2022-07-30 LAB
ANION GAP SERPL CALCULATED.3IONS-SCNC: 4 MMOL/L (ref 4–13)
BUN SERPL-MCNC: 14 MG/DL (ref 5–25)
CALCIUM SERPL-MCNC: 8.7 MG/DL (ref 8.3–10.1)
CHLORIDE SERPL-SCNC: 109 MMOL/L (ref 96–108)
CO2 SERPL-SCNC: 28 MMOL/L (ref 21–32)
CREAT SERPL-MCNC: 0.68 MG/DL (ref 0.6–1.3)
ERYTHROCYTE [DISTWIDTH] IN BLOOD BY AUTOMATED COUNT: 12.5 % (ref 11.6–15.1)
GFR SERPL CREATININE-BSD FRML MDRD: 103 ML/MIN/1.73SQ M
GLUCOSE SERPL-MCNC: 93 MG/DL (ref 65–140)
HCT VFR BLD AUTO: 38.5 % (ref 34.8–46.1)
HGB BLD-MCNC: 13.1 G/DL (ref 11.5–15.4)
MCH RBC QN AUTO: 29.9 PG (ref 26.8–34.3)
MCHC RBC AUTO-ENTMCNC: 34 G/DL (ref 31.4–37.4)
MCV RBC AUTO: 88 FL (ref 82–98)
PLATELET # BLD AUTO: 235 THOUSANDS/UL (ref 149–390)
PMV BLD AUTO: 10.7 FL (ref 8.9–12.7)
POTASSIUM SERPL-SCNC: 3 MMOL/L (ref 3.5–5.3)
RBC # BLD AUTO: 4.38 MILLION/UL (ref 3.81–5.12)
SODIUM SERPL-SCNC: 141 MMOL/L (ref 135–147)
WBC # BLD AUTO: 4.96 THOUSAND/UL (ref 4.31–10.16)

## 2022-07-30 PROCEDURE — 87040 BLOOD CULTURE FOR BACTERIA: CPT | Performed by: NURSE PRACTITIONER

## 2022-07-30 PROCEDURE — 96376 TX/PRO/DX INJ SAME DRUG ADON: CPT

## 2022-07-30 PROCEDURE — 0T768DZ DILATION OF RIGHT URETER WITH INTRALUMINAL DEVICE, VIA NATURAL OR ARTIFICIAL OPENING ENDOSCOPIC: ICD-10-PCS | Performed by: UROLOGY

## 2022-07-30 PROCEDURE — 99233 SBSQ HOSP IP/OBS HIGH 50: CPT | Performed by: UROLOGY

## 2022-07-30 PROCEDURE — 99223 1ST HOSP IP/OBS HIGH 75: CPT | Performed by: INTERNAL MEDICINE

## 2022-07-30 PROCEDURE — C1769 GUIDE WIRE: HCPCS | Performed by: UROLOGY

## 2022-07-30 PROCEDURE — 80048 BASIC METABOLIC PNL TOTAL CA: CPT | Performed by: INTERNAL MEDICINE

## 2022-07-30 PROCEDURE — 82360 CALCULUS ASSAY QUANT: CPT | Performed by: UROLOGY

## 2022-07-30 PROCEDURE — 74420 UROGRAPHY RTRGR +-KUB: CPT

## 2022-07-30 PROCEDURE — C1758 CATHETER, URETERAL: HCPCS | Performed by: UROLOGY

## 2022-07-30 PROCEDURE — 99232 SBSQ HOSP IP/OBS MODERATE 35: CPT | Performed by: NURSE PRACTITIONER

## 2022-07-30 PROCEDURE — 0TC68ZZ EXTIRPATION OF MATTER FROM RIGHT URETER, VIA NATURAL OR ARTIFICIAL OPENING ENDOSCOPIC: ICD-10-PCS | Performed by: UROLOGY

## 2022-07-30 PROCEDURE — 85027 COMPLETE CBC AUTOMATED: CPT | Performed by: INTERNAL MEDICINE

## 2022-07-30 PROCEDURE — 96366 THER/PROPH/DIAG IV INF ADDON: CPT

## 2022-07-30 PROCEDURE — BT1D0ZZ FLUOROSCOPY OF RIGHT KIDNEY, URETER AND BLADDER USING HIGH OSMOLAR CONTRAST: ICD-10-PCS | Performed by: UROLOGY

## 2022-07-30 PROCEDURE — 52356 CYSTO/URETERO W/LITHOTRIPSY: CPT | Performed by: UROLOGY

## 2022-07-30 PROCEDURE — C2617 STENT, NON-COR, TEM W/O DEL: HCPCS | Performed by: UROLOGY

## 2022-07-30 PROCEDURE — NC001 PR NO CHARGE: Performed by: NURSE PRACTITIONER

## 2022-07-30 DEVICE — INLAY OPTIMA URETERAL STENT W/O GUIDEWIRE
Type: IMPLANTABLE DEVICE | Site: BLADDER | Status: FUNCTIONAL
Brand: BARD® INLAY OPTIMA® URETERAL STENT

## 2022-07-30 RX ORDER — TAMSULOSIN HYDROCHLORIDE 0.4 MG/1
0.4 CAPSULE ORAL
Status: DISCONTINUED | OUTPATIENT
Start: 2022-07-30 | End: 2022-07-31 | Stop reason: HOSPADM

## 2022-07-30 RX ORDER — MAGNESIUM HYDROXIDE 1200 MG/15ML
LIQUID ORAL AS NEEDED
Status: DISCONTINUED | OUTPATIENT
Start: 2022-07-30 | End: 2022-07-30 | Stop reason: HOSPADM

## 2022-07-30 RX ORDER — LAMOTRIGINE 150 MG/1
150 TABLET ORAL
COMMUNITY

## 2022-07-30 RX ORDER — PROMETHAZINE HYDROCHLORIDE 25 MG/ML
12.5 INJECTION, SOLUTION INTRAMUSCULAR; INTRAVENOUS ONCE AS NEEDED
Status: DISCONTINUED | OUTPATIENT
Start: 2022-07-30 | End: 2022-07-30 | Stop reason: HOSPADM

## 2022-07-30 RX ORDER — HYDROMORPHONE HCL IN WATER/PF 6 MG/30 ML
0.2 PATIENT CONTROLLED ANALGESIA SYRINGE INTRAVENOUS
Status: DISCONTINUED | OUTPATIENT
Start: 2022-07-30 | End: 2022-07-30 | Stop reason: HOSPADM

## 2022-07-30 RX ORDER — LORAZEPAM 2 MG/ML
1 INJECTION INTRAMUSCULAR
Status: DISCONTINUED | OUTPATIENT
Start: 2022-07-30 | End: 2022-07-30 | Stop reason: HOSPADM

## 2022-07-30 RX ORDER — ACETAMINOPHEN 325 MG/1
650 TABLET ORAL EVERY 6 HOURS PRN
Status: DISCONTINUED | OUTPATIENT
Start: 2022-07-30 | End: 2022-07-31 | Stop reason: HOSPADM

## 2022-07-30 RX ORDER — FENTANYL CITRATE 50 UG/ML
INJECTION, SOLUTION INTRAMUSCULAR; INTRAVENOUS AS NEEDED
Status: DISCONTINUED | OUTPATIENT
Start: 2022-07-30 | End: 2022-07-30

## 2022-07-30 RX ORDER — HYDRALAZINE HYDROCHLORIDE 20 MG/ML
5 INJECTION INTRAMUSCULAR; INTRAVENOUS
Status: DISCONTINUED | OUTPATIENT
Start: 2022-07-30 | End: 2022-07-30 | Stop reason: HOSPADM

## 2022-07-30 RX ORDER — SENNOSIDES 8.6 MG
1 TABLET ORAL
Status: DISCONTINUED | OUTPATIENT
Start: 2022-07-30 | End: 2022-07-31 | Stop reason: HOSPADM

## 2022-07-30 RX ORDER — DEXAMETHASONE SODIUM PHOSPHATE 10 MG/ML
INJECTION, SOLUTION INTRAMUSCULAR; INTRAVENOUS AS NEEDED
Status: DISCONTINUED | OUTPATIENT
Start: 2022-07-30 | End: 2022-07-30

## 2022-07-30 RX ORDER — HYDROMORPHONE HCL/PF 1 MG/ML
0.5 SYRINGE (ML) INJECTION
Status: DISCONTINUED | OUTPATIENT
Start: 2022-07-30 | End: 2022-07-30 | Stop reason: HOSPADM

## 2022-07-30 RX ORDER — HYDROCODONE BITARTRATE AND ACETAMINOPHEN 5; 325 MG/1; MG/1
1 TABLET ORAL EVERY 4 HOURS PRN
Qty: 5 TABLET | Refills: 0 | Status: SHIPPED | OUTPATIENT
Start: 2022-07-30 | End: 2022-08-01

## 2022-07-30 RX ORDER — FENTANYL CITRATE/PF 50 MCG/ML
25 SYRINGE (ML) INJECTION
Status: DISCONTINUED | OUTPATIENT
Start: 2022-07-30 | End: 2022-07-30 | Stop reason: HOSPADM

## 2022-07-30 RX ORDER — CEFAZOLIN SODIUM 2 G/50ML
SOLUTION INTRAVENOUS AS NEEDED
Status: DISCONTINUED | OUTPATIENT
Start: 2022-07-30 | End: 2022-07-30

## 2022-07-30 RX ORDER — SODIUM CHLORIDE, SODIUM LACTATE, POTASSIUM CHLORIDE, CALCIUM CHLORIDE 600; 310; 30; 20 MG/100ML; MG/100ML; MG/100ML; MG/100ML
INJECTION, SOLUTION INTRAVENOUS CONTINUOUS PRN
Status: DISCONTINUED | OUTPATIENT
Start: 2022-07-30 | End: 2022-07-30

## 2022-07-30 RX ORDER — HYDROMORPHONE HCL/PF 1 MG/ML
1 SYRINGE (ML) INJECTION EVERY 4 HOURS PRN
Status: DISCONTINUED | OUTPATIENT
Start: 2022-07-30 | End: 2022-07-31 | Stop reason: HOSPADM

## 2022-07-30 RX ORDER — METOCLOPRAMIDE HYDROCHLORIDE 5 MG/ML
10 INJECTION INTRAMUSCULAR; INTRAVENOUS ONCE AS NEEDED
Status: DISCONTINUED | OUTPATIENT
Start: 2022-07-30 | End: 2022-07-30 | Stop reason: HOSPADM

## 2022-07-30 RX ORDER — SODIUM CHLORIDE, SODIUM LACTATE, POTASSIUM CHLORIDE, CALCIUM CHLORIDE 600; 310; 30; 20 MG/100ML; MG/100ML; MG/100ML; MG/100ML
125 INJECTION, SOLUTION INTRAVENOUS CONTINUOUS
Status: DISCONTINUED | OUTPATIENT
Start: 2022-07-30 | End: 2022-07-31 | Stop reason: HOSPADM

## 2022-07-30 RX ORDER — DOCUSATE SODIUM 100 MG/1
100 CAPSULE, LIQUID FILLED ORAL 2 TIMES DAILY
Status: DISCONTINUED | OUTPATIENT
Start: 2022-07-30 | End: 2022-07-31 | Stop reason: HOSPADM

## 2022-07-30 RX ORDER — LIDOCAINE HYDROCHLORIDE 20 MG/ML
INJECTION, SOLUTION EPIDURAL; INFILTRATION; INTRACAUDAL; PERINEURAL AS NEEDED
Status: DISCONTINUED | OUTPATIENT
Start: 2022-07-30 | End: 2022-07-30

## 2022-07-30 RX ORDER — MIDAZOLAM HYDROCHLORIDE 2 MG/2ML
INJECTION, SOLUTION INTRAMUSCULAR; INTRAVENOUS AS NEEDED
Status: DISCONTINUED | OUTPATIENT
Start: 2022-07-30 | End: 2022-07-30

## 2022-07-30 RX ORDER — PROPOFOL 10 MG/ML
INJECTION, EMULSION INTRAVENOUS AS NEEDED
Status: DISCONTINUED | OUTPATIENT
Start: 2022-07-30 | End: 2022-07-30

## 2022-07-30 RX ORDER — PREGABALIN 75 MG/1
150 CAPSULE ORAL
Status: DISCONTINUED | OUTPATIENT
Start: 2022-07-30 | End: 2022-07-31 | Stop reason: HOSPADM

## 2022-07-30 RX ORDER — POTASSIUM CHLORIDE 14.9 MG/ML
INJECTION INTRAVENOUS CONTINUOUS PRN
Status: DISCONTINUED | OUTPATIENT
Start: 2022-07-30 | End: 2022-07-30

## 2022-07-30 RX ORDER — CEPHALEXIN 500 MG/1
500 CAPSULE ORAL EVERY 12 HOURS SCHEDULED
Qty: 6 CAPSULE | Refills: 0 | Status: SHIPPED | OUTPATIENT
Start: 2022-07-30 | End: 2022-08-02

## 2022-07-30 RX ORDER — GLYCOPYRROLATE 0.2 MG/ML
INJECTION INTRAMUSCULAR; INTRAVENOUS AS NEEDED
Status: DISCONTINUED | OUTPATIENT
Start: 2022-07-30 | End: 2022-07-30

## 2022-07-30 RX ORDER — ONDANSETRON 2 MG/ML
4 INJECTION INTRAMUSCULAR; INTRAVENOUS EVERY 4 HOURS PRN
Status: DISCONTINUED | OUTPATIENT
Start: 2022-07-30 | End: 2022-07-31 | Stop reason: HOSPADM

## 2022-07-30 RX ORDER — ONDANSETRON 2 MG/ML
INJECTION INTRAMUSCULAR; INTRAVENOUS AS NEEDED
Status: DISCONTINUED | OUTPATIENT
Start: 2022-07-30 | End: 2022-07-30

## 2022-07-30 RX ORDER — NICOTINE 21 MG/24HR
1 PATCH, TRANSDERMAL 24 HOURS TRANSDERMAL DAILY
Status: DISCONTINUED | OUTPATIENT
Start: 2022-07-30 | End: 2022-07-30

## 2022-07-30 RX ORDER — ONDANSETRON 2 MG/ML
4 INJECTION INTRAMUSCULAR; INTRAVENOUS ONCE AS NEEDED
Status: DISCONTINUED | OUTPATIENT
Start: 2022-07-30 | End: 2022-07-30 | Stop reason: HOSPADM

## 2022-07-30 RX ORDER — LABETALOL HYDROCHLORIDE 5 MG/ML
10 INJECTION, SOLUTION INTRAVENOUS
Status: DISCONTINUED | OUTPATIENT
Start: 2022-07-30 | End: 2022-07-30 | Stop reason: HOSPADM

## 2022-07-30 RX ORDER — ALBUTEROL SULFATE 2.5 MG/3ML
2.5 SOLUTION RESPIRATORY (INHALATION) ONCE AS NEEDED
Status: DISCONTINUED | OUTPATIENT
Start: 2022-07-30 | End: 2022-07-30 | Stop reason: HOSPADM

## 2022-07-30 RX ORDER — SODIUM CHLORIDE, SODIUM GLUCONATE, SODIUM ACETATE, POTASSIUM CHLORIDE, MAGNESIUM CHLORIDE, SODIUM PHOSPHATE, DIBASIC, AND POTASSIUM PHOSPHATE .53; .5; .37; .037; .03; .012; .00082 G/100ML; G/100ML; G/100ML; G/100ML; G/100ML; G/100ML; G/100ML
100 INJECTION, SOLUTION INTRAVENOUS CONTINUOUS
Status: DISCONTINUED | OUTPATIENT
Start: 2022-07-30 | End: 2022-07-31

## 2022-07-30 RX ORDER — OXYCODONE HYDROCHLORIDE 10 MG/1
10 TABLET ORAL EVERY 6 HOURS PRN
Status: DISCONTINUED | OUTPATIENT
Start: 2022-07-30 | End: 2022-07-31 | Stop reason: HOSPADM

## 2022-07-30 RX ADMIN — PROPOFOL 150 MG: 10 INJECTION, EMULSION INTRAVENOUS at 08:13

## 2022-07-30 RX ADMIN — TAMSULOSIN HYDROCHLORIDE 0.4 MG: 0.4 CAPSULE ORAL at 16:37

## 2022-07-30 RX ADMIN — FENTANYL CITRATE 25 MCG: 50 INJECTION INTRAMUSCULAR; INTRAVENOUS at 07:55

## 2022-07-30 RX ADMIN — OXYCODONE HYDROCHLORIDE 10 MG: 10 TABLET ORAL at 18:24

## 2022-07-30 RX ADMIN — OXYCODONE HYDROCHLORIDE 10 MG: 10 TABLET ORAL at 10:25

## 2022-07-30 RX ADMIN — FENTANYL CITRATE 25 MCG: 50 INJECTION INTRAMUSCULAR; INTRAVENOUS at 08:51

## 2022-07-30 RX ADMIN — FENTANYL CITRATE 25 MCG: 50 INJECTION INTRAMUSCULAR; INTRAVENOUS at 08:59

## 2022-07-30 RX ADMIN — GLYCOPYRROLATE 0.1 MG: 0.2 INJECTION INTRAMUSCULAR; INTRAVENOUS at 07:56

## 2022-07-30 RX ADMIN — HYDROMORPHONE HYDROCHLORIDE 1 MG: 1 INJECTION, SOLUTION INTRAMUSCULAR; INTRAVENOUS; SUBCUTANEOUS at 05:18

## 2022-07-30 RX ADMIN — PREGABALIN 150 MG: 75 CAPSULE ORAL at 03:50

## 2022-07-30 RX ADMIN — LIDOCAINE HYDROCHLORIDE 100 MG: 20 INJECTION, SOLUTION EPIDURAL; INFILTRATION; INTRACAUDAL at 08:13

## 2022-07-30 RX ADMIN — HYDROMORPHONE HYDROCHLORIDE 1 MG: 1 INJECTION, SOLUTION INTRAMUSCULAR; INTRAVENOUS; SUBCUTANEOUS at 21:32

## 2022-07-30 RX ADMIN — Medication 25 MCG: at 09:14

## 2022-07-30 RX ADMIN — OXYCODONE HYDROCHLORIDE 10 MG: 10 TABLET ORAL at 02:19

## 2022-07-30 RX ADMIN — NOREPINEPHRINE BITARTRATE 3 MCG/MIN: 1 INJECTION, SOLUTION, CONCENTRATE INTRAVENOUS at 08:15

## 2022-07-30 RX ADMIN — SODIUM CHLORIDE, POTASSIUM CHLORIDE, SODIUM LACTATE AND CALCIUM CHLORIDE 125 ML/HR: 600; 310; 30; 20 INJECTION, SOLUTION INTRAVENOUS at 16:38

## 2022-07-30 RX ADMIN — ACETAMINOPHEN 650 MG: 325 TABLET ORAL at 21:32

## 2022-07-30 RX ADMIN — SENNOSIDES 8.6 MG: 8.6 TABLET, FILM COATED ORAL at 21:32

## 2022-07-30 RX ADMIN — DOCUSATE SODIUM 100 MG: 100 CAPSULE, LIQUID FILLED ORAL at 18:24

## 2022-07-30 RX ADMIN — LAMOTRIGINE 150 MG: 100 TABLET ORAL at 03:50

## 2022-07-30 RX ADMIN — PREGABALIN 150 MG: 75 CAPSULE ORAL at 21:32

## 2022-07-30 RX ADMIN — HYDROMORPHONE HYDROCHLORIDE 1 MG: 1 INJECTION, SOLUTION INTRAMUSCULAR; INTRAVENOUS; SUBCUTANEOUS at 14:02

## 2022-07-30 RX ADMIN — CEFAZOLIN SODIUM 2000 MG: 2 SOLUTION INTRAVENOUS at 08:05

## 2022-07-30 RX ADMIN — HYDROMORPHONE HYDROCHLORIDE 1 MG: 1 INJECTION, SOLUTION INTRAMUSCULAR; INTRAVENOUS; SUBCUTANEOUS at 00:45

## 2022-07-30 RX ADMIN — DEXAMETHASONE SODIUM PHOSPHATE 10 MG: 10 INJECTION, SOLUTION INTRAMUSCULAR; INTRAVENOUS at 08:15

## 2022-07-30 RX ADMIN — SODIUM CHLORIDE, SODIUM LACTATE, POTASSIUM CHLORIDE, AND CALCIUM CHLORIDE: .6; .31; .03; .02 INJECTION, SOLUTION INTRAVENOUS at 07:40

## 2022-07-30 RX ADMIN — SODIUM CHLORIDE, POTASSIUM CHLORIDE, SODIUM LACTATE AND CALCIUM CHLORIDE 125 ML/HR: 600; 310; 30; 20 INJECTION, SOLUTION INTRAVENOUS at 09:24

## 2022-07-30 RX ADMIN — ONDANSETRON 4 MG: 2 INJECTION INTRAMUSCULAR; INTRAVENOUS at 07:50

## 2022-07-30 RX ADMIN — FENTANYL CITRATE 25 MCG: 50 INJECTION INTRAMUSCULAR; INTRAVENOUS at 08:18

## 2022-07-30 RX ADMIN — POTASSIUM CHLORIDE: 14.9 INJECTION, SOLUTION INTRAVENOUS at 07:50

## 2022-07-30 RX ADMIN — MIDAZOLAM 2 MG: 1 INJECTION INTRAMUSCULAR; INTRAVENOUS at 08:01

## 2022-07-30 RX ADMIN — SODIUM CHLORIDE, SODIUM GLUCONATE, SODIUM ACETATE, POTASSIUM CHLORIDE, MAGNESIUM CHLORIDE, SODIUM PHOSPHATE, DIBASIC, AND POTASSIUM PHOSPHATE 100 ML/HR: .53; .5; .37; .037; .03; .012; .00082 INJECTION, SOLUTION INTRAVENOUS at 02:21

## 2022-07-30 RX ADMIN — LAMOTRIGINE 150 MG: 100 TABLET ORAL at 21:32

## 2022-07-30 NOTE — ANESTHESIA PREPROCEDURE EVALUATION
Procedure:  CYSTOSCOPY URETEROSCOPY WITH LITHOTRIPSY HOLMIUM LASER, RETROGRADE PYELOGRAM AND INSERTION STENT URETERAL (Right Bladder)    Relevant Problems   ANESTHESIA (within normal limits)      /RENAL   (+) SWETA (acute kidney injury) ([facility] [address] )   (+) Hydronephrosis with urinary obstruction due to ureteral calculus      NEURO/PSYCH   (+) Anxiety   (+) Depression      Other   (+) Obesity (BMI 30-39  9)        Physical Exam    Airway    Mallampati score: II  TM Distance: >3 FB  Neck ROM: full     Dental   No notable dental hx     Cardiovascular  Rhythm: regular, Rate: normal, Cardiovascular exam normal    Pulmonary  Pulmonary exam normal Breath sounds clear to auscultation,     Other Findings        Anesthesia Plan  ASA Score- 3     Anesthesia Type- general with ASA Monitors  Additional Monitors:   Airway Plan: LMA  Plan Factors-Exercise tolerance (METS): >4 METS  Chart reviewed  EKG reviewed  Existing labs reviewed  Patient summary reviewed  Patient is not a current smoker  Induction- intravenous  Postoperative Plan- Plan for postoperative opioid use  Planned trial extubation    Informed Consent- Anesthetic plan and risks discussed with patient  I personally reviewed this patient with the CRNA  Discussed and agreed on the Anesthesia Plan with the CRNA  Ti Villalta

## 2022-07-30 NOTE — ED NOTES
SLETS trans 0100 to [facility] p930  Dr Carmona Lighter accepting  [facility], [address] [place] [address]  07/29/22 2977

## 2022-07-30 NOTE — ASSESSMENT & PLAN NOTE
· Presenting to [facility] with right flank pain, nausea/vomiting  CT abdomen/pelvis revealing right hydronephrosis secondary to tandem 3 and 4 mm ureteral calculi, additionally patient with acute renal failure however without UTI  · Seen by urology at [facility], transferred here for urology intervention not available this weekend at [facility]    Keep NPO  · Continue with IV fluids  · Continue with pain control regimen and antiemetics  · Continue Flomax  · Strain all urine

## 2022-07-30 NOTE — ED NOTES
Report called to Karen OMALLEY on MS4 at [facility]        Emiliano Pinedo RN  07/30/22 9145

## 2022-07-30 NOTE — PROGRESS NOTES
[address] [facility]  Progress Note - Arthur Morton 1975, 50 y o  female MRN: [id number]  Unit/Bed#: -William Encounter: [id number]  Primary Care Provider: Stella Torres MD   Date and time admitted to hospital: 7/30/2022  1:45 AM    * Hydronephrosis with urinary obstruction due to ureteral calculus  Assessment & Plan  · Presented to [facility] with right flank pain, nausea/vomiting  CT abdomen/pelvis revealing right hydronephrosis secondary to tandem 3 and 4 mm ureteral calculi, additionally patient with acute renal failure however without UTI  · Seen by urology at [facility], transferred here for urology intervention not available this weekend at [facility]  · Pt post op day # 0 CYSTOSCOPY URETEROSCOPY WITH LITHOTRIPSY HOLMIUM LASER, RETROGRADE PYELOGRAM AND INSERTION STENT URETERAL (Right)  · pt voided post op bloody urine/burning  discussed with pt this is normal   · Pt with hematuria post procedure and ongoing burning - discussed normal post procedure   · Continue with IV fluids  · Continue with pain control regimen and antiemetics  · Continue Flomax  · Blood cultures obtained at [facility] negative new set ordered slb - in process   · No urine cultures - afebrile/ no leukocytosis today improved since admission      SWETA (acute kidney injury) ([facility] [address] )  Assessment & Plan  · POA to [facility] ED, baseline appearing 0 7-0 8  · Improving with IV fluids, continue  · Urology evaluation of hydronephrosis secondary to ureterolithiasis as above  · Now sp stent placement   · Will fu with urology outpt   · However, will monitor overnight - dc in am   · Measure PVR and bladder scan, UA at [facility] with 4-10 RBCs, large bilirubin  · Avoid nephrotoxins and avoid hypotension          VTE Pharmacologic Prophylaxis: VTE Score: 3 High Risk (Score >/= 5) - Pharmacological DVT Prophylaxis Contraindicated  Sequential Compression Devices Ordered      Patient Centered Rounds: I performed bedside rounds with nursing staff today  Discussions with Specialists or Other Care Team Provider: nursing     Education and Discussions with Family / Patient: Patient declined call to   Time Spent for Care: 45 minutes  More than 50% of total time spent on counseling and coordination of care as described above  Current Length of Stay: 0 day(s)  Current Patient Status: Inpatient   Certification Statement: The patient will continue to require additional inpatient hospital stay due to just post op   Discharge Plan: Anticipate discharge in 24-48 hrs to home  Code Status: Level 1 - Full Code    Subjective:   Pt has just returnend from OR she does reports she had time to brush her teeth on return as well as void  She states she if voiding rather blood urine right now and does have burning   She states she doesn't feel really "good"   Objective:     Vitals:   Temp (24hrs), Av 9 °F (36 6 °C), Min:97 5 °F (36 4 °C), Max:98 9 °F (37 2 °C)    Temp:  [97 5 °F (36 4 °C)-98 9 °F (37 2 °C)] 98 1 °F (36 7 °C)  HR:  [66-90] 68  Resp:  [13-22] 18  BP: ()/(50-84) 105/65  SpO2:  [93 %-100 %] 93 %  Body mass index is 34 62 kg/m²  Input and Output Summary (last 24 hours): Intake/Output Summary (Last 24 hours) at 2022 1619  Last data filed at 2022 1401  Gross per 24 hour   Intake 1530 ml   Output 600 ml   Net 930 ml       Physical Exam:   Physical Exam  Constitutional:       General: She is not in acute distress  Appearance: She is not ill-appearing, toxic-appearing or diaphoretic  HENT:      Head: Normocephalic and atraumatic  Mouth/Throat:      Pharynx: Oropharynx is clear  Eyes:      General:         Right eye: No discharge  Left eye: No discharge  Conjunctiva/sclera: Conjunctivae normal    Cardiovascular:      Rate and Rhythm: Normal rate  Heart sounds: No murmur heard  No friction rub  No gallop  Pulmonary:      Effort: No respiratory distress  Breath sounds: No stridor  No wheezing or rhonchi  Abdominal:      General: There is no distension  Palpations: There is no mass  Tenderness: There is no abdominal tenderness  There is no guarding or rebound  Hernia: No hernia is present  Musculoskeletal:         General: No swelling, tenderness, deformity or signs of injury  Right lower leg: No edema  Left lower leg: No edema  Skin:     Coloration: Skin is not jaundiced or pale  Findings: No bruising, erythema, lesion or rash  Neurological:      Mental Status: She is alert and oriented to person, place, and time  Additional Data:     Labs:  Results from last 7 days   Lab Units 07/30/22  0528 07/29/22  0806   WBC Thousand/uL 4 96 10 71*   HEMOGLOBIN g/dL 13 1 13 4   HEMATOCRIT % 38 5 39 2   PLATELETS Thousands/uL 235 246   NEUTROS PCT %  --  75   LYMPHS PCT %  --  14   MONOS PCT %  --  10   EOS PCT %  --  0     Results from last 7 days   Lab Units 07/30/22  0528 07/29/22  0633 07/28/22  2113   SODIUM mmol/L 141   < > 139   POTASSIUM mmol/L 3 0*   < > 3 1*   CHLORIDE mmol/L 109*   < > 101   CO2 mmol/L 28   < > 23   BUN mg/dL 14   < > 29*   CREATININE mg/dL 0 68   < > 1 72*   ANION GAP mmol/L 4   < > 15*   CALCIUM mg/dL 8 7   < > 9 9   ALBUMIN g/dL  --   --  4 3   TOTAL BILIRUBIN mg/dL  --   --  0 88   ALK PHOS U/L  --   --  50   ALT U/L  --   --  32   AST U/L  --   --  19   GLUCOSE RANDOM mg/dL 93   < > 104    < > = values in this interval not displayed  Results from last 7 days   Lab Units 07/28/22  2224   INR  1 02             Results from last 7 days   Lab Units 07/28/22  2224   LACTIC ACID mmol/L 1 0       Lines/Drains:  Invasive Devices  Report    Peripheral Intravenous Line  Duration           Peripheral IV 07/28/22 Right Antecubital 1 day    Peripheral IV 07/30/22 Left Hand <1 day                      Imaging: No pertinent imaging reviewed      Recent Cultures (last 7 days):   Results from last 7 days   Lab Units 07/28/22 2224   BLOOD CULTURE  No Growth at 24 hrs  No Growth at 24 hrs  Last 24 Hours Medication List:   Current Facility-Administered Medications   Medication Dose Route Frequency Provider Last Rate    acetaminophen  650 mg Oral Q6H PRN Deanne Trujillo MD      docusate sodium  100 mg Oral BID Deanne Trujillo MD      HYDROmorphone  1 mg Intravenous Q4H PRN Deanne Trujillo MD      HYDROmorphone  1 mg Intravenous Q4H PRN Deanne Trujillo MD      lactated ringers  125 mL/hr Intravenous Continuous Deanne Trujillo  mL/hr (07/30/22 9813)    lamoTRIgine  150 mg Oral HS Deanne Trujillo MD      multi-electrolyte  100 mL/hr Intravenous Continuous Deanne Trujillo  mL/hr (07/30/22 0221)    ondansetron  4 mg Intravenous Q4H PRN Deanne Trujillo MD      oxyCODONE  10 mg Oral Q6H PRN Deanne Trujillo MD      pregabalin  150 mg Oral HS Deanne Trujillo MD      senna  1 tablet Oral HS Deanne Trujillo MD      tamsulosin  0 4 mg Oral Daily With Christopher Crum MD          Today, Patient Was Seen By: RYLEY Jarvis    **Please Note: This note may have been constructed using a voice recognition system  **

## 2022-07-30 NOTE — ASSESSMENT & PLAN NOTE
· POA to [facility] ED, baseline appearing 0 7-0 8  · Improving with IV fluids, continue      · Urology evaluation of hydronephrosis secondary to ureterolithiasis as above  · Now sp stent placement   · Will fu with urology outpt   · However, will monitor overnight - dc in am   · Measure PVR and bladder scan, UA at [facility] with 4-10 RBCs, large bilirubin  · Avoid nephrotoxins and avoid hypotension

## 2022-07-30 NOTE — ED NOTES
Attempted to medicate patient for pain  Entered patient's room and patient states she would like to speak with the MD  Informed patient's primary RN  Will reach out to admitting provider        Chema Mancilla RN  07/29/22 2016

## 2022-07-30 NOTE — CONSULTS
HPI:  Shruthi Magaña is a 49-year-old female without prior  history presenting to [facility] emergency room with right flank pain, nausea vomiting not accompanied by fever, chills, dysuria gross hematuria  CT of the abdomen and pelvis demonstrated right hydronephrosis secondary to tandem 3 and 4 mm ureteral calculi  Patient is afebrile, normotensive but positively symptomatic  Presenting serum creatinine 1 7  This is trended downward to 1 3 with IV fluids  Urinalysis negative for WBCs and bacteria microscopic  WBC count 10 7  Urologic consultation requested for further management recommendations          Active Problems:        Patient Active Problem List   Diagnosis    Zoster    Headache    Neuropathy    Flank pain    Constipation    Rectal bleed    Dependence on nicotine from cigarettes    Diverticulitis    Ureterolithiasis    SWETA [facility]acute kidney injury) ([facility] [address] )    Electrolyte abnormality               Impressions  · Right ureteral calculi--tandem 3 and 4 mm  · Hydronephrosis  · Renal Colic     Recommendations  1  Initiate aggressive IVFs   2  Flomax  3  Analgesia/Narcotics   4  Anti-emetics   5  ATBs empirically while awaiting culture   6  Strain urine   7  NPO for OR if no spontaneous expulsion achieved  This will necessitate transfer to [facility] were all urologic procedures or centralized over the weekend      Explained risk, benefits and potential complications of ureteroscopic stone extraction  Patient has verbalized understanding of possible need for ureteral stent only for any signs of infection and/or technical difficult prohibiting stone retrieval etc ; requiring staged ureteroscopy electively once recovered and infection free as OP  Formal consent by surgeon      ADDENDUM:    PATIENT SEEN AND EXAMINED PERSONALLY  REVIEWED HER HISTORY AND CURRENT FINDINGS  REVIEWED CT SCAN    DISCUSSED TECHNIQUE, RISKS, BENEFITS FOR URETEROSCOPIC STONE EXTRACTION, POSSIBLE LASER, STENT PLACEMENT  UNDERSTANDS POTENTIAL STENT COLIC  ALL QUESTIONS ANSWERED TO HER SATISFACTION  WILL PROCEED AS RECOMMENDED  CONSENT OBTAINED  Wandy Branch MD             Past Medical History:   Diagnosis Date    SWETA (acute kidney injury) ([facility] [address] ) 2022    Anxiety      Atrial septal defect      Diverticulitis of colon      History of shingles      Mitral valve prolapse                 Past Surgical History:   Procedure Laterality Date    APPENDECTOMY         SECTION        CHOLECYSTECTOMY        HYSTERECTOMY        SD COLONOSCOPY FLX DX W/COLLJ SPEC WHEN PFRMD N/A 2018     Procedure: COLONOSCOPY;  Surgeon: Cecil Mathur MD;  Location: [facility];   Service: Gastroenterology    SIGMOID RESECTION / RECTOPEXY        TUBAL LIGATION                   Family History   Adopted: Yes   Problem Relation Age of Onset    No Known Problems Mother      No Known Problems Father           Social History            Socioeconomic History    Marital status: /Civil Union       Spouse name: Not on file    Number of children: Not on file    Years of education: Not on file    Highest education level: Not on file   Occupational History    Not on file   Tobacco Use    Smoking status: Current Every Day Smoker       Packs/day: 0 50       Types: Cigarettes    Smokeless tobacco: Never Used   Substance and Sexual Activity    Alcohol use: No    Drug use: No    Sexual activity: Not on file   Other Topics Concern    Not on file   Social History Narrative    Not on file      Social Determinants of Health      Financial Resource Strain: Not on file   Food Insecurity: Not on file   Transportation Needs: Not on file   Physical Activity: Not on file   Stress: Not on file   Social Connections: Not on file   Intimate Partner Violence: Not on file   Housing Stability: Not on file         No Known Allergies     Review of Systems  10 point review of systems negative except as noted in HPI  Constitutional:   negative for - chills or fever  Cardiovascular:   no chest pain or dyspnea on exertion  Gastrointestinal:   positive for - abdominal pain and nausea/vomiting  Genito-Urinary:   no dysuria, trouble voiding, or hematuria  Neurological:   no TIA or stroke symptoms         Chart Review   Allergies, current medications, history, problem list     Vital Signs  /77 (BP Location: Right arm)   Pulse 69   Temp 98 6 °F (37 °C) (Oral)   Resp 18   Ht 5' 1" (1 549 m)   Wt 79 4 kg (175 lb)   LMP 11/05/2006   SpO2 94%   BMI 33 07 kg/m²      Physical Exam  General appearance: alert and oriented, in no acute distress, appears stated age, cooperative and mild distress  Head: Normocephalic, without obvious abnormality, atraumatic  Neck: no adenopathy, no carotid bruit, no JVD, supple, symmetrical, trachea midline and thyroid not enlarged, symmetric, no tenderness/mass/nodules  Lungs: diminished breath sounds  Heart: regular rate and rhythm, S1, S2 normal, no murmur, click, rub or gallop  Abdomen: abnormal findings:  moderate tenderness in the RLQ and in the lower abdomen  Extremities: extremities normal, warm and well-perfused; no cyanosis, clubbing, or edema  Pulses: 2+ and symmetric  Neurologic: Grossly normal  No urinary drains                Laboratory Studies        Lab Results   Component Value Date     HGBA1C 5 4 01/27/2022     K 3 5 07/29/2022      07/29/2022     CO2 25 07/29/2022     CREATININE 1 32 (H) 07/29/2022     BUN 23 07/29/2022     MG 2 1 07/28/2022     PHOS 4 1 09/08/2018            Lab Results   Component Value Date     WBC 10 71 (H) 07/29/2022     RBC 4 34 07/29/2022     HGB 13 4 07/29/2022     HCT 39 2 07/29/2022     MCV 90 07/29/2022     MCH 30 9 07/29/2022     RDW 13 1 07/29/2022      07/29/2022            Imaging and Other Studies  )  CT abdomen pelvis w contrast     Result Date: 7/29/2022  Narrative: CT ABDOMEN AND PELVIS WITH IV CONTRAST INDICATION:   Lower abdominal and flank pain    COMPARISON:  None  TECHNIQUE:  CT examination of the abdomen and pelvis was performed  Axial, sagittal, and coronal 2D reformatted images were created from the source data and submitted for interpretation  Radiation dose length product (DLP) for this visit:  677 mGy-cm   This examination, like all CT scans performed in the [facility], was performed utilizing techniques to minimize radiation dose exposure, including the use of iterative reconstruction and automated exposure control  IV Contrast:  70 mL of iohexol (OMNIPAQUE) Enteric Contrast:  Enteric contrast was not administered  FINDINGS: ABDOMEN LOWER CHEST:  Clear lung bases  LIVER/BILIARY TREE:  Hepatic steatosis and hepatomegaly :  Subcentimeter hypoattenuating lesions, too small to characterize, though likely to represent cysts  GALLBLADDER:  Cholecystectomy  SPLEEN:  Unremarkable  PANCREAS:  Unremarkable  ADRENAL GLANDS:  Unremarkable  Mildly delayed right nephrogram and enlargement of the right kidney  Mild to moderate right hydronephrosis and mild proximal hydroureter  Obstructing 4 mm calculus in the right ureter at the L4 level     Second, more distal calculus in the right ureter at the L4-5 level measuring 3 mm  Right renal cortical cysts measuring up to 1 2 cm STOMACH AND BOWEL:  Postsurgical change in the rectosigmoid  Diverticulosis without evidence of diverticulitis or colitis  No bowel obstruction or  APPENDIX:  No findings to suggest appendicitis  ABDOMINOPELVIC CAVITY:  No ascites  No pneumoperitoneum  No lymphadenopathy  VESSELS:  No abdominal aortic aneurysm  PELVIS REPRODUCTIVE ORGANS:  Prior hysterectomy  URINARY BLADDER:  Limited evaluation of the collapsed bladder    ABDOMINAL WALL/INGUINAL REGIONS:  Unremarkable  OSSEOUS STRUCTURES:  L5-S1 disc and facet osteophytosis  Moderate to severe right neural foraminal narrowing       Impression: Mild right hydronephrosis and proximal hydroureter  Tandem obstructing calculi in the right ureter measuring 3 and 4 mm   Workstation performed: [id number]         Medications            Current Facility-Administered Medications   Medication Dose Route Frequency Provider Last Rate    docusate sodium  100 mg Oral BID Ave Schilder, MD      HYDROmorphone  0 5 mg Intravenous Q4H PRN Ave Schilder, MD      multi-electrolyte  100 mL/hr Intravenous Continuous JONNA Oreilly-C 100 mL/hr (07/29/22 1245)    ondansetron  4 mg Intravenous Q4H PRN Ave Schilder, MD      oxyCODONE  5 mg Oral Q6H PRN Ave Schilder, MD      senna  1 tablet Oral HS Ave Schilder, MD  Justina Senter ON 7/30/2022] tamsulosin  0 4 mg Oral Daily With Roc Palomo MD

## 2022-07-30 NOTE — ED NOTES
Patient offered PO pain medication for abdominal pain (see MAR)  Pt requesting IV pain medications  Pt told that 10 mg Oxycodone for pain is prescribed and should be administered first and then IV medications are for breakthrough pain  Pt refusing pain medication        Alonzo Davis RN  07/29/22 8951

## 2022-07-30 NOTE — OP NOTE
OPERATIVE REPORT  PATIENT NAME: Amaury Naqvi    :  1975  MRN: [id number]  Pt Location:  CYSTO ROOM 01    SURGERY DATE: 2022    Surgeon(s) and Role:     * Lily Simons MD - Primary    Preop Diagnosis:  Ureterolithiasis [N20 1]    Post-Op Diagnosis Codes:     * Ureterolithiasis [N20 1]    Procedure(s) (LRB):  CYSTOSCOPY URETEROSCOPY WITH LITHOTRIPSY HOLMIUM LASER, RETROGRADE PYELOGRAM AND INSERTION STENT URETERAL (Right)    Specimen(s):  ID Type Source Tests Collected by Time Destination   A :  Calculus Ureter, Right STONE ANALYSIS Lily Simons MD 2022 [id number]        Estimated Blood Loss:   Minimal    Drains:  * No LDAs found *    Anesthesia Type:   General    Operative Indications:  Ureterolithiasis [N20 1]      Operative Findings:  Two ureteral calculi, lasered and retrieved  Complications:   None    Procedure and Technique:  The patient was identified, brought to the operating room, and placed on the table in supine position  After induction of general anesthesia, the patient was placed in dorsal lithotomy position and prepped and draped in the usual sterile fashion  A complete formal timeout was performed  The 25 Liberian rigid cystoscope was placed per urethra and cystoscopy was performed  There was no bladder abnormality identified  The Right ureteral orifice was identified and cannulated with a Solo wire  A semirigid ureteroscope was then placed alongside the wire into the ureter, and the stones were identified  A holmium laser fiber was placed and laser lithotripsy was performed  When the stone was of suitable size, a 4 wire stone basket was placed and the stone fragments were retrieved  At this point, a retrograde pyelogram was performed delineating the upper urinary tract anatomy  No further filling defect identified  The ureteral length was measured    The safety wire was backloaded into the cystoscope and a 26 cm x 6 Liberian double-J stent was placed with string  The patient tolerated the procedure well and was transferred to the recovery room awake alert and in stable condition  Plan:  Discharge home when stable  Stent/string until Wednesday 8/3  To be managed, removed outpatient       I was present for the entire procedure    Patient Disposition:  PACU       SIGNATURE: Rosette Melton MD  DATE: July 30, 2022  TIME: 8:54 AM

## 2022-07-30 NOTE — ASSESSMENT & PLAN NOTE
· Presented to [facility] with right flank pain, nausea/vomiting  CT abdomen/pelvis revealing right hydronephrosis secondary to tandem 3 and 4 mm ureteral calculi, additionally patient with acute renal failure however without UTI  · Seen by urology at [facility], transferred here for urology intervention not available this weekend at [facility]     · Pt post op day # 0 CYSTOSCOPY URETEROSCOPY WITH LITHOTRIPSY HOLMIUM LASER, RETROGRADE PYELOGRAM AND INSERTION STENT URETERAL (Right)  · pt voided post op bloody urine/burning  discussed with pt this is normal   · Pt with hematuria post procedure and ongoing burning - discussed normal post procedure   · Continue with IV fluids  · Continue with pain control regimen and antiemetics  · Continue Flomax  · Blood cultures obtained at [facility] negative new set ordered slb - in process   · No urine cultures - afebrile/ no leukocytosis today improved since admission

## 2022-07-30 NOTE — ED NOTES
This RN attempted to call report to [facility] P[facility] for continuity of care  Unfortunately, report was not given due to potential issues with bed placement  According to Darlene Cespedes RN, they are still trying to figure things out and were under the impression that the patient was coming at 0800  Will report to ER Charge RN       Christina Jones RN  07/29/22 5506

## 2022-07-30 NOTE — ASSESSMENT & PLAN NOTE
· POA to [facility] ED, baseline appearing 0 7-0 8  · Improving with IV fluids, continue      · Urology evaluation of hydronephrosis secondary to ureterolithiasis as above  · Measure PVR and bladder scan, UA at [facility] with 4-10 RBCs, large bilirubin  · Avoid nephrotoxins and avoid hypotension

## 2022-07-30 NOTE — H&P
[address] 1975, 50 y o  female MRN: [id number]  Unit/Bed#: -01 Encounter: [id number]  Primary Care Provider: Valentin Peter MD   Date and time admitted to hospital: 7/30/2022  1:45 AM    * Hydronephrosis with urinary obstruction due to ureteral calculus  Assessment & Plan  · Presenting to [facility] with right flank pain, nausea/vomiting  CT abdomen/pelvis revealing right hydronephrosis secondary to tandem 3 and 4 mm ureteral calculi, additionally patient with acute renal failure however without UTI  · Seen by urology at [facility], transferred here for urology intervention not available this weekend at [facility]  Keep NPO  · Continue with IV fluids  · Continue with pain control regimen and antiemetics  · Continue Flomax  · Strain all urine    SWETA (acute kidney injury) ([facility] [address] )  Assessment & Plan  · POA to [facility] ED, baseline appearing 0 7-0 8  · Improving with IV fluids, continue  · Urology evaluation of hydronephrosis secondary to ureterolithiasis as above  · Measure PVR and bladder scan, UA at [facility] with 4-10 RBCs, large bilirubin  · Avoid nephrotoxins and avoid hypotension      VTE Prophylaxis: Pharmacologic VTE Prophylaxis contraindicated due to Preprocedure  / sequential compression device   Code Status: Level 1 - Full Code   POLST: POLST form is not discussed and not completed at this time  Discussion with family:  Offered however patient declines at this time    Anticipated Length of Stay:  Patient will be admitted on an Inpatient basis with an anticipated length of stay of  greater than 2 midnights  Justification for Hospital Stay: Please see detailed plans noted above  Chief Complaint:     Right flank pain, obstructing ureteral stones  History of Present Illness:  Mariely Escobar is a 50 y o  female who initially presented to the [facility] ED 07/28/2022 with right-sided flank pain    She stated this had been progressive for the past 3 days, associated with increasing nausea and vomiting and decreased during this span, and on the date of presentation noted intractable vomiting with pain  She denies any fever/chills or known sick contacts  During ED evaluation at [facility] a CT renal stone study revealed obstructing tandem ureteral stones causing mild right hydronephrosis, and labs additionally revealing acute renal failure  Case was discussed between ED physician and Urology on-call who advised transferred to this hospital for Urology intervention not available at [facility] this weekend; unfortunately transfer was delayed due to limited bed space here thus patient was seen in consultation by hospitalist service and Urology AP at [facility] prior to transfer  Currently, patient is lying in bed somewhat uncomfortable appearing, noting persistence of right flank pain although currently 6/10  She does note some residual nausea but states she has had no further vomiting  Denies any personal history of renal stones; states she is adopted and unsure of her family history      Review of Systems:    Constitutional:  Denies fever or chills   Eyes:  Denies change in visual acuity   HENT:  Denies nasal congestion or sore throat   Respiratory:  Denies cough or shortness of breath   Cardiovascular:  Denies chest pain or edema   GI:  Denies vomiting, bloody stools or diarrhea but reported abdominal pain and nausea  :  Denies dysuria but report decreased urinary output and flank pain  Musculoskeletal:  Denies back pain or joint pain   Integument:  Denies rash   Neurologic:  Denies headache, focal weakness or sensory changes   Endocrine:  Denies polyuria or polydipsia   Lymphatic:  Denies swollen glands   Psychiatric:  Denies depression or anxiety     Past Medical and Surgical History:   Past Medical History:   Diagnosis Date    SWETA [facility]acute kidney injury) ([facility] [address] ) 7/29/2022    Anxiety     Atrial septal defect     Diverticulitis of colon     History of shingles     Mitral valve prolapse      Past Surgical History:   Procedure Laterality Date    APPENDECTOMY       SECTION      CHOLECYSTECTOMY      HYSTERECTOMY      OH COLONOSCOPY FLX DX W/COLLJ SPEC WHEN PFRMD N/A 2018    Procedure: COLONOSCOPY;  Surgeon: Barnie Gosselin, MD;  Location: [facility]; Service: Gastroenterology    SIGMOID RESECTION / RECTOPEXY      TUBAL LIGATION         Meds/Allergies:  Medications Prior to Admission   Medication    ALPRAZolam (XANAX) 0 5 mg tablet    HYDROcodone-acetaminophen (NORCO) 5-325 mg per tablet    lidocaine viscous (XYLOCAINE) 2 % mucosal solution    polyethylene glycol (MIRALAX) 17 g packet    pregabalin (LYRICA) 75 mg capsule    senna (SENOKOT) 8 6 mg       Allergies: No Known Allergies  History:  Marital Status: /Civil Union     Substance Use History:   Social History     Substance and Sexual Activity   Alcohol Use No     Social History     Tobacco Use   Smoking Status Current Every Day Smoker    Packs/day: 0 50    Types: Cigarettes   Smokeless Tobacco Never Used     Social History     Substance and Sexual Activity   Drug Use No       Family History:  Family History   Adopted: Yes   Problem Relation Age of Onset    No Known Problems Mother     No Known Problems Father        Physical Exam:     Vitals:   Height: 5' (152 4 cm) (22 0154)    Constitutional:  Well developed, well nourished, no acute distress, non-toxic appearance   Eyes:  PERRL, conjunctiva normal   HENT:  Atraumatic, external ears normal, nose normal, oropharynx moist, no pharyngeal exudates   Neck- normal range of motion, no tenderness, supple   Respiratory:  No respiratory distress, normal breath sounds, no rales, no wheezing   Cardiovascular:  Normal rate, normal rhythm, no murmurs, no gallops, no rubs   GI:  Soft, nondistended, normal bowel sounds, right lower quadrant tenderness to palpation, no organomegaly, no mass, no rebound, no guarding :  Right CVA tenderness  Musculoskeletal:  No edema, no tenderness, no deformities  Back- no tenderness  Integument:  Well hydrated, no rash   Lymphatic:  No lymphadenopathy noted   Neurologic:  Alert &awake, communicative, CN 2-12 normal, normal motor function, normal sensory function, no focal deficits noted   Psychiatric:  Speech and behavior appropriate       Lab Results: I have personally reviewed pertinent reports  Results from last 7 days   Lab Units 07/29/22  0806   WBC Thousand/uL 10 71*   HEMOGLOBIN g/dL 13 4   HEMATOCRIT % 39 2   PLATELETS Thousands/uL 246   NEUTROS PCT % 75   LYMPHS PCT % 14   MONOS PCT % 10   EOS PCT % 0     Results from last 7 days   Lab Units 07/29/22  0633 07/28/22  2113   POTASSIUM mmol/L 3 5 3 1*   CHLORIDE mmol/L 107 101   CO2 mmol/L 25 23   BUN mg/dL 23 29*   CREATININE mg/dL 1 32* 1 72*   CALCIUM mg/dL 8 7 9 9   ALK PHOS U/L  --  50   ALT U/L  --  32   AST U/L  --  19     Results from last 7 days   Lab Units 07/28/22  2224   INR  1 02       Imaging: I have personally reviewed pertinent reports  CT abdomen pelvis w contrast    Result Date: 7/29/2022  Narrative: CT ABDOMEN AND PELVIS WITH IV CONTRAST INDICATION:   Lower abdominal and flank pain    COMPARISON:  None  TECHNIQUE:  CT examination of the abdomen and pelvis was performed  Axial, sagittal, and coronal 2D reformatted images were created from the source data and submitted for interpretation  Radiation dose length product (DLP) for this visit:  677 mGy-cm   This examination, like all CT scans performed in the [facility], was performed utilizing techniques to minimize radiation dose exposure, including the use of iterative reconstruction and automated exposure control  IV Contrast:  70 mL of iohexol (OMNIPAQUE) Enteric Contrast:  Enteric contrast was not administered  FINDINGS: ABDOMEN LOWER CHEST:  Clear lung bases   LIVER/BILIARY TREE:  Hepatic steatosis and hepatomegaly :  Subcentimeter hypoattenuating lesions, too small to characterize, though likely to represent cysts  GALLBLADDER:  Cholecystectomy  SPLEEN:  Unremarkable  PANCREAS:  Unremarkable  ADRENAL GLANDS:  Unremarkable  Mildly delayed right nephrogram and enlargement of the right kidney  Mild to moderate right hydronephrosis and mild proximal hydroureter  Obstructing 4 mm calculus in the right ureter at the L4 level     Second, more distal calculus in the right ureter at the L4-5 level measuring 3 mm  Right renal cortical cysts measuring up to 1 2 cm STOMACH AND BOWEL:  Postsurgical change in the rectosigmoid  Diverticulosis without evidence of diverticulitis or colitis  No bowel obstruction or  APPENDIX:  No findings to suggest appendicitis  ABDOMINOPELVIC CAVITY:  No ascites  No pneumoperitoneum  No lymphadenopathy  VESSELS:  No abdominal aortic aneurysm  PELVIS REPRODUCTIVE ORGANS:  Prior hysterectomy  URINARY BLADDER:  Limited evaluation of the collapsed bladder    ABDOMINAL WALL/INGUINAL REGIONS:  Unremarkable  OSSEOUS STRUCTURES:  L5-S1 disc and facet osteophytosis  Moderate to severe right neural foraminal narrowing  Impression: Mild right hydronephrosis and proximal hydroureter  Tandem obstructing calculi in the right ureter measuring 3 and 4 mm  Workstation performed: [id number]         ** Please Note: Dragon 360 Dictation voice to text software was used in the creation of this document   **

## 2022-07-30 NOTE — ED NOTES
Pt left with [facility] and is en route to [facility]        Amey Sandifer, SHAHRAM  07/30/22 6746

## 2022-07-30 NOTE — ED NOTES
This nurse in to see patient  Crying and upset, asking what she did wrong to piss anyone off  Patient states that she is upset for multiple reasons  Patient able to be redirected, placed on BP monitor and O2  Medication given for pain, see MAR  States that she would like to stay and not sign out AMA "because I know I need to be transferred "   Patient resting comfortably at this time          Brittani Bales RN  07/29/22 8652

## 2022-07-30 NOTE — ANESTHESIA POSTPROCEDURE EVALUATION
Post-Op Assessment Note    CV Status:  Stable  Pain Score: 3    Pain management: adequate  Multimodal analgesia used between 6 hours prior to anesthesia start to PACU discharge    Mental Status:  Alert and awake   Hydration Status:  Euvolemic   PONV Controlled:  Controlled   Airway Patency:  Patent      Post Op Vitals Reviewed: Yes      Staff: Anesthesiologist, CRNA         No complications documented      BP 96/60 (07/30/22 0859)    Temp 97 5 °F (36 4 °C) (07/30/22 0859)    Pulse 90 (07/30/22 0859)   Resp 20 (07/30/22 0859)    SpO2 100 % (07/30/22 0859)

## 2022-07-30 NOTE — PLAN OF CARE
Problem: PAIN - ADULT  Goal: Verbalizes/displays adequate comfort level or baseline comfort level  Description: Interventions:  - Encourage patient to monitor pain and request assistance  - Assess pain using appropriate pain scale  - Administer analgesics based on type and severity of pain and evaluate response  - Implement non-pharmacological measures as appropriate and evaluate response  - Consider cultural and social influences on pain and pain management  - Notify physician/advanced practitioner if interventions unsuccessful or patient reports new pain  Outcome: Progressing     Problem: INFECTION - ADULT  Goal: Absence or prevention of progression during hospitalization  Description: INTERVENTIONS:  - Assess and monitor for signs and symptoms of infection  - Monitor lab/diagnostic results  - Monitor all insertion sites, i e  indwelling lines, tubes, and drains  - Monitor endotracheal if appropriate and nasal secretions for changes in amount and color  - [place] appropriate cooling/warming therapies per order  - Administer medications as ordered  - Instruct and encourage patient and family to use good hand hygiene technique  - Identify and instruct in appropriate isolation precautions for identified infection/condition  Outcome: Progressing     Problem: SAFETY ADULT  Goal: Patient will remain free of falls  Description: INTERVENTIONS:  - Educate patient/family on patient safety including physical limitations  - Instruct patient to call for assistance with activity   - Consult OT/PT to assist with strengthening/mobility   - Keep Call bell within reach  - Keep bed low and locked with side rails adjusted as appropriate  - Keep care items and personal belongings within reach  - Initiate and maintain comfort rounds  - Make Fall Risk Sign visible to staff  - Offer Toileting every 2 Hours, in advance of need  - Initiate/Maintain bed alarm  - Obtain necessary fall risk management equipment  - Apply yellow socks and bracelet for high fall risk patients  - Consider moving patient to room near nurses station  Outcome: Progressing  Goal: Maintain or return to baseline ADL function  Description: INTERVENTIONS:  -  Assess patient's ability to carry out ADLs; assess patient's baseline for ADL function and identify physical deficits which impact ability to perform ADLs (bathing, care of mouth/teeth, toileting, grooming, dressing, etc )  - Assess/evaluate cause of self-care deficits   - Assess range of motion  - Assess patient's mobility; develop plan if impaired  - Assess patient's need for assistive devices and provide as appropriate  - Encourage maximum independence but intervene and supervise when necessary  - Involve family in performance of ADLs  - Assess for home care needs following discharge   - Consider OT consult to assist with ADL evaluation and planning for discharge  - Provide patient education as appropriate  Outcome: Progressing  Goal: Maintains/Returns to pre admission functional level  Description: INTERVENTIONS:  - Perform BMAT or MOVE assessment daily    - Set and communicate daily mobility goal to care team and patient/family/caregiver  - Collaborate with rehabilitation services on mobility goals if consulted  - Perform Range of Motion 3 times a day  - Reposition patient every 2 hours    - Dangle patient 3 times a day  - Stand patient 3 times a day  - Ambulate patient 3 times a day  - Out of bed to chair 3 times a day   - Out of bed for meals 3 times a day  - Out of bed for toileting  - Record patient progress and toleration of activity level   Outcome: Progressing     Problem: DISCHARGE PLANNING  Goal: Discharge to home or other facility with appropriate resources  Description: INTERVENTIONS:  - Identify barriers to discharge w/patient and caregiver  - Arrange for needed discharge resources and transportation as appropriate  - Identify discharge learning needs (meds, wound care, etc )  - Arrange for interpretive services to assist at discharge as needed  - Refer to Case Management Department for coordinating discharge planning if the patient needs post-hospital services based on physician/advanced practitioner order or complex needs related to functional status, cognitive ability, or social support system  Outcome: Progressing     Problem: Knowledge Deficit  Goal: Patient/family/caregiver demonstrates understanding of disease process, treatment plan, medications, and discharge instructions  Description: Complete learning assessment and assess knowledge base    Interventions:  - Provide teaching at level of understanding  - Provide teaching via preferred learning methods  Outcome: Progressing

## 2022-07-31 VITALS
OXYGEN SATURATION: 96 % | DIASTOLIC BLOOD PRESSURE: 76 MMHG | TEMPERATURE: 98.1 F | HEIGHT: 60 IN | BODY MASS INDEX: 34.8 KG/M2 | WEIGHT: 177.25 LBS | HEART RATE: 71 BPM | RESPIRATION RATE: 22 BRPM | SYSTOLIC BLOOD PRESSURE: 120 MMHG

## 2022-07-31 PROBLEM — N17.9 AKI (ACUTE KIDNEY INJURY) (HCC): Status: RESOLVED | Noted: 2022-07-29 | Resolved: 2022-07-31

## 2022-07-31 PROBLEM — E87.6 HYPOKALEMIA: Status: ACTIVE | Noted: 2022-07-31

## 2022-07-31 LAB
ANION GAP SERPL CALCULATED.3IONS-SCNC: 5 MMOL/L (ref 4–13)
BUN SERPL-MCNC: 9 MG/DL (ref 5–25)
CALCIUM SERPL-MCNC: 9.4 MG/DL (ref 8.3–10.1)
CHLORIDE SERPL-SCNC: 107 MMOL/L (ref 96–108)
CO2 SERPL-SCNC: 31 MMOL/L (ref 21–32)
CREAT SERPL-MCNC: 0.8 MG/DL (ref 0.6–1.3)
GFR SERPL CREATININE-BSD FRML MDRD: 86 ML/MIN/1.73SQ M
GLUCOSE SERPL-MCNC: 86 MG/DL (ref 65–140)
POTASSIUM SERPL-SCNC: 2.9 MMOL/L (ref 3.5–5.3)
SODIUM SERPL-SCNC: 143 MMOL/L (ref 135–147)

## 2022-07-31 PROCEDURE — 80048 BASIC METABOLIC PNL TOTAL CA: CPT | Performed by: NURSE PRACTITIONER

## 2022-07-31 PROCEDURE — 99232 SBSQ HOSP IP/OBS MODERATE 35: CPT | Performed by: NURSE PRACTITIONER

## 2022-07-31 PROCEDURE — 99239 HOSP IP/OBS DSCHRG MGMT >30: CPT | Performed by: NURSE PRACTITIONER

## 2022-07-31 RX ORDER — POTASSIUM CHLORIDE 14.9 MG/ML
20 INJECTION INTRAVENOUS ONCE
Status: COMPLETED | OUTPATIENT
Start: 2022-07-31 | End: 2022-07-31

## 2022-07-31 RX ORDER — POTASSIUM CHLORIDE 20 MEQ/1
40 TABLET, EXTENDED RELEASE ORAL ONCE
Status: COMPLETED | OUTPATIENT
Start: 2022-07-31 | End: 2022-07-31

## 2022-07-31 RX ORDER — KETOROLAC TROMETHAMINE 30 MG/ML
15 INJECTION, SOLUTION INTRAMUSCULAR; INTRAVENOUS ONCE
Status: COMPLETED | OUTPATIENT
Start: 2022-07-31 | End: 2022-07-31

## 2022-07-31 RX ORDER — DOCUSATE SODIUM 100 MG/1
100 CAPSULE, LIQUID FILLED ORAL 2 TIMES DAILY
Qty: 20 CAPSULE | Refills: 0 | Status: SHIPPED | OUTPATIENT
Start: 2022-07-31

## 2022-07-31 RX ORDER — ACETAMINOPHEN 325 MG/1
650 TABLET ORAL EVERY 6 HOURS PRN
Refills: 0
Start: 2022-07-31

## 2022-07-31 RX ORDER — ATROPA BELLADONNA AND OPIUM 16.2; 3 MG/1; MG/1
30 SUPPOSITORY RECTAL EVERY 8 HOURS PRN
Qty: 4 SUPPOSITORY | Refills: 0 | Status: SHIPPED | OUTPATIENT
Start: 2022-07-31 | End: 2022-08-01

## 2022-07-31 RX ORDER — MAGNESIUM SULFATE HEPTAHYDRATE 40 MG/ML
2 INJECTION, SOLUTION INTRAVENOUS ONCE
Status: COMPLETED | OUTPATIENT
Start: 2022-07-31 | End: 2022-07-31

## 2022-07-31 RX ORDER — PREGABALIN 150 MG/1
150 CAPSULE ORAL
Qty: 30 CAPSULE | Refills: 0 | Status: SHIPPED | OUTPATIENT
Start: 2022-07-31 | End: 2022-08-10

## 2022-07-31 RX ORDER — ATROPA BELLADONNA AND OPIUM 16.2; 3 MG/1; MG/1
30 SUPPOSITORY RECTAL EVERY 8 HOURS PRN
Status: DISCONTINUED | OUTPATIENT
Start: 2022-07-31 | End: 2022-07-31 | Stop reason: HOSPADM

## 2022-07-31 RX ORDER — OXYBUTYNIN CHLORIDE 10 MG/1
10 TABLET, EXTENDED RELEASE ORAL DAILY
Status: DISCONTINUED | OUTPATIENT
Start: 2022-07-31 | End: 2022-07-31 | Stop reason: HOSPADM

## 2022-07-31 RX ORDER — OXYBUTYNIN CHLORIDE 10 MG/1
10 TABLET, EXTENDED RELEASE ORAL DAILY
Qty: 10 TABLET | Refills: 0 | Status: SHIPPED | OUTPATIENT
Start: 2022-08-01

## 2022-07-31 RX ORDER — PHENAZOPYRIDINE HYDROCHLORIDE 100 MG/1
200 TABLET, FILM COATED ORAL
Status: DISCONTINUED | OUTPATIENT
Start: 2022-07-31 | End: 2022-07-31 | Stop reason: HOSPADM

## 2022-07-31 RX ORDER — TAMSULOSIN HYDROCHLORIDE 0.4 MG/1
0.4 CAPSULE ORAL
Qty: 30 CAPSULE | Refills: 0 | Status: SHIPPED | OUTPATIENT
Start: 2022-08-01

## 2022-07-31 RX ORDER — PHENAZOPYRIDINE HYDROCHLORIDE 200 MG/1
200 TABLET, FILM COATED ORAL
Qty: 10 TABLET | Refills: 0 | Status: SHIPPED | OUTPATIENT
Start: 2022-08-01 | End: 2022-08-03

## 2022-07-31 RX ADMIN — OXYCODONE HYDROCHLORIDE 10 MG: 10 TABLET ORAL at 16:15

## 2022-07-31 RX ADMIN — POTASSIUM CHLORIDE 20 MEQ: 14.9 INJECTION, SOLUTION INTRAVENOUS at 12:29

## 2022-07-31 RX ADMIN — POTASSIUM CHLORIDE 40 MEQ: 1500 TABLET, EXTENDED RELEASE ORAL at 12:40

## 2022-07-31 RX ADMIN — SODIUM CHLORIDE, POTASSIUM CHLORIDE, SODIUM LACTATE AND CALCIUM CHLORIDE 125 ML/HR: 600; 310; 30; 20 INJECTION, SOLUTION INTRAVENOUS at 08:50

## 2022-07-31 RX ADMIN — OXYCODONE HYDROCHLORIDE 10 MG: 10 TABLET ORAL at 00:41

## 2022-07-31 RX ADMIN — PHENAZOPYRIDINE 200 MG: 100 TABLET ORAL at 16:14

## 2022-07-31 RX ADMIN — HYDROMORPHONE HYDROCHLORIDE 1 MG: 1 INJECTION, SOLUTION INTRAMUSCULAR; INTRAVENOUS; SUBCUTANEOUS at 02:03

## 2022-07-31 RX ADMIN — POTASSIUM CHLORIDE 40 MEQ: 1500 TABLET, EXTENDED RELEASE ORAL at 17:41

## 2022-07-31 RX ADMIN — MAGNESIUM SULFATE HEPTAHYDRATE 2 G: 40 INJECTION, SOLUTION INTRAVENOUS at 12:28

## 2022-07-31 RX ADMIN — HYDROMORPHONE HYDROCHLORIDE 1 MG: 1 INJECTION, SOLUTION INTRAMUSCULAR; INTRAVENOUS; SUBCUTANEOUS at 10:26

## 2022-07-31 RX ADMIN — DOCUSATE SODIUM 100 MG: 100 CAPSULE, LIQUID FILLED ORAL at 17:41

## 2022-07-31 RX ADMIN — KETOROLAC TROMETHAMINE 15 MG: 30 INJECTION, SOLUTION INTRAMUSCULAR; INTRAVENOUS at 12:29

## 2022-07-31 RX ADMIN — SODIUM CHLORIDE, POTASSIUM CHLORIDE, SODIUM LACTATE AND CALCIUM CHLORIDE 125 ML/HR: 600; 310; 30; 20 INJECTION, SOLUTION INTRAVENOUS at 00:42

## 2022-07-31 RX ADMIN — TAMSULOSIN HYDROCHLORIDE 0.4 MG: 0.4 CAPSULE ORAL at 16:15

## 2022-07-31 RX ADMIN — OXYBUTYNIN 10 MG: 10 TABLET, FILM COATED, EXTENDED RELEASE ORAL at 14:29

## 2022-07-31 RX ADMIN — DOCUSATE SODIUM 100 MG: 100 CAPSULE, LIQUID FILLED ORAL at 08:42

## 2022-07-31 NOTE — ASSESSMENT & PLAN NOTE
Pt noted after am labs to have hypokalemia 2 9  · Ordered po this am 40meq iv hung at around 1700 will dc since pt reports pain with iv potassium   · Will administer additional 40meq po now before discharge pt feeling a little better

## 2022-07-31 NOTE — PLAN OF CARE
Problem: PAIN - ADULT  Goal: Verbalizes/displays adequate comfort level or baseline comfort level  Description: Interventions:  - Encourage patient to monitor pain and request assistance  - Assess pain using appropriate pain scale  - Administer analgesics based on type and severity of pain and evaluate response  - Implement non-pharmacological measures as appropriate and evaluate response  - Consider cultural and social influences on pain and pain management  - Notify physician/advanced practitioner if interventions unsuccessful or patient reports new pain  Outcome: Progressing     Problem: INFECTION - ADULT  Goal: Absence or prevention of progression during hospitalization  Description: INTERVENTIONS:  - Assess and monitor for signs and symptoms of infection  - Monitor lab/diagnostic results  - Monitor all insertion sites, i e  indwelling lines, tubes, and drains  - Monitor endotracheal if appropriate and nasal secretions for changes in amount and color  - [place] appropriate cooling/warming therapies per order  - Administer medications as ordered  - Instruct and encourage patient and family to use good hand hygiene technique  - Identify and instruct in appropriate isolation precautions for identified infection/condition  Outcome: Progressing     Problem: SAFETY ADULT  Goal: Patient will remain free of falls  Description: INTERVENTIONS:  - Educate patient/family on patient safety including physical limitations  - Instruct patient to call for assistance with activity   - Consult OT/PT to assist with strengthening/mobility   - Keep Call bell within reach  - Keep bed low and locked with side rails adjusted as appropriate  - Keep care items and personal belongings within reach  - Initiate and maintain comfort rounds  - Make Fall Risk Sign visible to staff  - Offer Toileting every  Hours, in advance of need  - Initiate/Maintain larm  - Obtain necessary fall risk management equipment:   - Apply yellow socks and bracelet for high fall risk patients  - Consider moving patient to room near nurses station  Outcome: Progressing  Goal: Maintain or return to baseline ADL function  Description: INTERVENTIONS:  -  Assess patient's ability to carry out ADLs; assess patient's baseline for ADL function and identify physical deficits which impact ability to perform ADLs (bathing, care of mouth/teeth, toileting, grooming, dressing, etc )  - Assess/evaluate cause of self-care deficits   - Assess range of motion  - Assess patient's mobility; develop plan if impaired  - Assess patient's need for assistive devices and provide as appropriate  - Encourage maximum independence but intervene and supervise when necessary  - Involve family in performance of ADLs  - Assess for home care needs following discharge   - Consider OT consult to assist with ADL evaluation and planning for discharge  - Provide patient education as appropriate  Outcome: Progressing  Goal: Maintains/Returns to pre admission functional level  Description: INTERVENTIONS:  - Perform BMAT or MOVE assessment daily    - Set and communicate daily mobility goal to care team and patient/family/caregiver     - Collaborate with rehabilitation services on mobility goals if consulted  - Perform Range of M  - Out of bed for toileting  - Record patient progress and toleration of activity level   Outcome: Progressing     Problem: DISCHARGE PLANNING  Goal: Discharge to home or other facility with appropriate resources  Description: INTERVENTIONS:  - Identify barriers to discharge w/patient and caregiver  - Arrange for needed discharge resources and transportation as appropriate  - Identify discharge learning needs (meds, wound care, etc )  - Arrange for interpretive services to assist at discharge as needed  - Refer to Case Management Department for coordinating discharge planning if the patient needs post-hospital services based on physician/advanced practitioner order or complex needs related to functional status, cognitive ability, or social support system  Outcome: Progressing     Problem: Knowledge Deficit  Goal: Patient/family/caregiver demonstrates understanding of disease process, treatment plan, medications, and discharge instructions  Description: Complete learning assessment and assess knowledge base  Interventions:  - Provide teaching at level of understanding  - Provide teaching via preferred learning methods  Outcome: Progressing     Problem: Potential for Falls  Goal: Patient will remain free of falls  Description: INTERVENTIONS:  - Educate patient/family on patient safety including physical limitations  - Instruct patient to call for assistance with activity   - Consult OT/PT to assist with strengthening/mobility   - Keep Call bell within reach  - Keep bed low and locked with side rails adjusted as appropriate  - Keep care items and personal belongings within reach  - Initiate and maintain comfort rounds  - Make Fall Risk Sign visible to staff  - Offer Toileting every Hours, in advance of need  - Initiate/Maintain larm  - Obtain necessary fall risk management equipment:  - Apply yellow socks and bracelet for high fall risk patients  - Consider moving patient to room near nurses station  Outcome: Progressing     Problem: Nutrition/Hydration-ADULT  Goal: Nutrient/Hydration intake appropriate for improving, restoring or maintaining nutritional needs  Description: Monitor and assess patient's nutrition/hydration status for malnutrition  Collaborate with interdisciplinary team and initiate plan and interventions as ordered  Monitor patient's weight and dietary intake as ordered or per policy  Utilize nutrition screening tool and intervene as necessary  Determine patient's food preferences and provide high-protein, high-caloric foods as appropriate       INTERVENTIONS:  - Monitor oral intake, urinary output, labs, and treatment plans  - Assess nutrition and hydration status and recommend course of action  - Evaluate amount of meals eaten  - Assist patient with eating if necessary   - Allow adequate time for meals  - Recommend/ encourage appropriate diets, oral nutritional supplements, and vitamin/mineral supplements  - Order, calculate, and assess calorie counts as needed  - Recommend, monitor, and adjust tube feedings and TPN/PPN based on assessed needs  - Assess need for intravenous fluids  - Provide specific nutrition/hydration education as appropriate  - Include patient/family/caregiver in decisions related to nutrition  Outcome: Progressing

## 2022-07-31 NOTE — ASSESSMENT & PLAN NOTE
· Presented to [facility] with right flank pain, nausea/vomiting  CT abdomen/pelvis revealing right hydronephrosis secondary to tandem 3 and 4 mm ureteral calculi, additionally patient with acute renal failure however without UTI  · Seen by urology at [facility], transferred here for urology intervention not available this weekend at [facility]     · Pt post op day # 1 CYSTOSCOPY URETEROSCOPY WITH LITHOTRIPSY HOLMIUM LASER, RETROGRADE PYELOGRAM AND INSERTION STENT URETERAL (Right)  · Stent on string will have removed on Tuesday or Wednesday - will fu in [facility] office with [facility] and [address] in 2 months for further long term stone mgmt per urology   · This am pt with reports of urinating sensation feeling like a piece of glass   · Voiding pink urine no noted clots /pt reports possibly fragment of stone passed this am   · Did administer low dose Toradol iv once due to pain this am (renal function improved)    · Continue with pain control regimen and antiemetics  · Continue Flomax  · Discussed with urology AP pt received stent colic cocktail (hydration/ stool softner and modest narcotic)   · Blood cultures obtained at [facility] negative new set ordered slb - in process   · No urine cultures - afebrile/ no leukocytosis today improved since admission

## 2022-07-31 NOTE — UTILIZATION REVIEW
Initial Clinical Review    Admission: Date/Time/Statement:   Admission Orders (From admission, onward)     Ordered        07/30/22 0152  Inpatient Admission  Once                      Orders Placed This Encounter   Procedures    Inpatient Admission     Standing Status:   Standing     Number of Occurrences:   1     Order Specific Question:   Level of Care     Answer:   Med Surg [16]     Order Specific Question:   Estimated length of stay     Answer:   More than 2 Midnights     Order Specific Question:   Certification     Answer:   I certify that inpatient services are medically necessary for this patient for a duration of greater than two midnights  See H&P and MD Progress Notes for additional information about the patient's course of treatment  Initial Presentation: 50 y o  female presents to B as a transfer from [address] [place] [address] ED where she initially presented with c/o progressive R-sided flank pain x 3 days a/w n/v  In ED CT reanl stone study revealed obstructing tandem ureteral stones causing mild right hydronephrosis, and labs additionally revealing acute renal failure  Tx'd to [facility] for urology eval and continuation of tx  Wbc 10 71, cR 1 32  ADMIT INPATIENT to M/S UNIT with HYDRONEPHROSIS with URINARY OBSTRUCTION d/t URETERAL CALCULUS  Urology consulted -- keep npo  Continue IVFs  Continue pain control, antimetics  Flomax  Strain all urine  Measure PVR and bladder scan, UA at [facility] with 4-10 RBCs, large bilirubin  Avoid nephrotoxins and avoid hypotension  OPERATIVE REPORT  SURGERY DATE: 7/30/2022  Procedure(s) (LRB):  CYSTOSCOPY URETEROSCOPY WITH LITHOTRIPSY HOLMIUM LASER, RETROGRADE PYELOGRAM AND INSERTION STENT URETERAL (Right)  Anesthesia Type:   General   Operative Findings:  Two ureteral calculi, lasered and retrieved        Date: 7/31   Day 2: POD #1 -- Pt c/o pain with urination  Continue IVFs, encourage oral hydration  Flomax, stool softener, analgesics prn  Aqua K pad   Continue supportive care  Wt Readings from Last 1 Encounters:   07/30/22 80 4 kg (177 lb 4 oz)     Vital Signs:   Date/Time Temp Pulse Resp BP MAP (mmHg) SpO2 O2 Flow Rate (L/min) O2 Device   07/31/22 15:54:50 -- 71 -- 120/76 91 96 % -- --   07/31/22 1200 -- -- -- -- -- 97 % -- None (Room air)   07/31/22 11:18:32 98 1 °F (36 7 °C) 69 22 132/82 99 97 % -- --   07/31/22 07:43:36 98 2 °F (36 8 °C) 82 18 127/76 93 95 % -- --   07/31/22 03:11:44 98 1 °F (36 7 °C) 61 18 105/56 72 96 % -- --   07/30/22 23:15:26 98 3 °F (36 8 °C) 75 18 111/73 86 92 % -- --   07/30/22 19:45:26 98 2 °F (36 8 °C) 69 17 128/82 97 94 % -- --   07/30/22 1300 -- 68 -- 105/65 78 93 % -- --   07/30/22 12:45:19 98 1 °F (36 7 °C) 71 18 112/68 83 94 % -- --   07/30/22 1200 -- -- -- -- -- 93 % -- None (Room air)   07/30/22 10:26:06 97 9 °F (36 6 °C) 76 16 118/84 95 93 % -- --   07/30/22 0935 -- 74 13 -- -- 93 % -- --   07/30/22 0930 -- 74 15 109/60 77 93 % -- --   07/30/22 0926 98 9 °F (37 2 °C) 74 17 122/61 -- 95 % -- None (Room air)   07/30/22 0915 -- 86 14 120/56 81 94 % -- None (Room air)   07/30/22 0900 -- 84 22 109/58 84 99 % -- None (Room air)   07/30/22 0859 97 5 °F (36 4 °C) 90 20 96/60 -- 100 % 6 L/min Simple mask   07/30/22 0856 97 5 °F (36 4 °C) 90 14 96/50 74 98 % 6 L/min Simple mask   07/30/22 01:55:44 97 6 °F (36 4 °C) 66 16 111/73 86 95 % -- None (Room air)       Pertinent Labs/Diagnostic Test Results:   Ct a/p 7/29: Mild right hydronephrosis and proximal hydroureter  Tandem obstructing calculi in the right ureter measuring 3 and 4 mm      Results from last 7 days   Lab Units 07/28/22  2230   SARS-COV-2  Negative     Results from last 7 days   Lab Units 07/30/22  0528 07/29/22  0806 07/28/22  2113   WBC Thousand/uL 4 96 10 71* 13 09*   HEMOGLOBIN g/dL 13 1 13 4 15 5*   HEMATOCRIT % 38 5 39 2 44 5   PLATELETS Thousands/uL 235 246 320   NEUTROS ABS Thousands/µL  --  8 11* 9 90*     Results from last 7 days   Lab Units 07/31/22  0913 07/30/22  0528 07/29/22  1516 07/28/22  2113   SODIUM mmol/L 143 141 142 139   POTASSIUM mmol/L 2 9* 3 0* 3 5 3 1*   CHLORIDE mmol/L 107 109* 107 101   CO2 mmol/L 31 28 25 23   ANION GAP mmol/L 5 4 10 15*   BUN mg/dL 9 14 23 29*   CREATININE mg/dL 0 80 0 68 1 32* 1 72*   EGFR ml/min/1 73sq m 86 103 47 34   CALCIUM mg/dL 9 4 8 7 8 7 9 9   MAGNESIUM mg/dL  --   --   --  2 1     Results from last 7 days   Lab Units 07/28/22  2113   AST U/L 19   ALT U/L 32   ALK PHOS U/L 50   TOTAL PROTEIN g/dL 8 7*   ALBUMIN g/dL 4 3   TOTAL BILIRUBIN mg/dL 0 88     Results from last 7 days   Lab Units 07/31/22  0913 07/30/22  0528 07/29/22  0633 07/28/22  2113   GLUCOSE RANDOM mg/dL 86 93 108 104     Results from last 7 days   Lab Units 07/28/22  2224   PROTIME seconds 13 2   INR  1 02   PTT seconds 28     Results from last 7 days   Lab Units 07/28/22  2224   LACTIC ACID mmol/L 1 0     Results from last 7 days   Lab Units 07/28/22  2113   LIPASE u/L 326     Results from last 7 days   Lab Units 07/29/22  0530   CLARITY UA  Clear   COLOR UA  Yellow   SPEC GRAV UA  1 020   PH UA  6 0   GLUCOSE UA mg/dl Negative   KETONES UA mg/dl Negative   BLOOD UA  Moderate*   PROTEIN UA mg/dl Negative   NITRITE UA  Negative   BILIRUBIN UA  Large*   UROBILINOGEN UA E U /dl 0 2   LEUKOCYTES UA  Negative   WBC UA /hpf 1-2   RBC UA /hpf 4-10*   BACTERIA UA /hpf Occasional   EPITHELIAL CELLS WET PREP /hpf Occasional     Results from last 7 days   Lab Units 07/28/22  2230   INFLUENZA A PCR  Negative   INFLUENZA B PCR  Negative   RSV PCR  Negative     Results from last 7 days   Lab Units 07/30/22  1248 07/28/22  2224   BLOOD CULTURE  Received in Microbiology Lab  Culture in Progress  Received in Microbiology Lab  Culture in Progress  No Growth at 48 hrs  No Growth at 48 hrs         Past Medical History:   Diagnosis Date    SW[facility]A [facility]) ([facility] [address] ) 7/29/2022    Anxiety     Atrial septal defect     Diverticulitis of colon     History of shingles  Mitral valve prolapse      Present on Admission:   SWETA (acute kidney injury) ([facility] [address] )   Hydronephrosis with urinary obstruction due to ureteral calculus      Admitting Diagnosis: Ureterolithiasis [N20 1]  Age/Sex: 50 y o  female  Admission Orders:  Scheduled Medications:  docusate sodium, 100 mg, Oral, BID  lamoTRIgine, 150 mg, Oral, HS  oxybutynin, 10 mg, Oral, Daily  phenazopyridine, 200 mg, Oral, TID With Meals  pregabalin, 150 mg, Oral, HS  senna, 1 tablet, Oral, HS  tamsulosin, 0 4 mg, Oral, Daily With Dinner    Continuous IV Infusions:  lactated ringers, 125 mL/hr, Intravenous, Continuous    PRN Meds:  acetaminophen, 650 mg, Oral, Q6H PRN 7/30 x1  belladonna-opium, 30 mg, Rectal, Q8H PRN  HYDROmorphone, 1 mg, Intravenous, Q4H PRN 7/30 x3, 7/31 x2  ondansetron, 4 mg, Intravenous, Q4H PRN  oxyCODONE, 10 mg, Oral, Q6H PRN 7/30 x3, 7/31 x2         Network Utilization Review Department  ATTENTION: Please call with any questions or concerns to [phone] and carefully listen to the prompts so that you are directed to the right person  All voicemails are confidential   Rosy Hardin all requests for admission clinical reviews, approved or denied determinations and any other requests to dedicated fax number below belonging to the campus where the patient is receiving treatment   List of dedicated fax numbers for the Facilities:  [address] DENIALS (Administrative/Medical Necessity) [phone]   [address] (Maternity/NICU/Pediatrics) 261 [facility] [address] [place] [address]  [address] Medical [place] [address] [place] [address] [address] [facility] [address] [place] [address] [place] [address] [phone]

## 2022-07-31 NOTE — TELEPHONE ENCOUNTER
Zoila Joel is a 47-year-old female admitted for renal colic secondary to obstructing stone she was taken to the OR by Dr Arianne Mckeon 7/30 for ureteroscopy and laser lithotripsy  Please contact patient to make sure ureteral stent is removed by Wednesday 8/3  Peak 2nd visit with AP in 2--3 months with KUB and ultrasound sending results to provider for which patient will be seen in the office  Thank you

## 2022-07-31 NOTE — PROGRESS NOTES
Progress Note - Karla Croft 50 y o  female MRN: [id number]    Unit/Bed#: -William Encounter: [id number]      Assessment:  Karla Croft is a pleasant 47-year-old female presenting to [facility] with right flank pain secondary to CT confirmed right ureteral calculus with resultant hydronephrosis  Postoperative day 1 cystoscopy, retrograde pyelography, ureteroscopy, laser lithotripsy and insertion of right ureteral stent  Patient is afebrile, hemodynamically stable but reports intractable stent related colic with dysuria, hematuria and passage of small stone fragments  Otherwise, lab work reveals resolution of prior leukocytosis and acute kidney injury--with interval normalization  Prior blood cultures x2 were negative for growth within the 1st 48 hours  Second set pending  Plan:  · Administer stent colic cocktail to include:  Oral and IV hydration, bladder anti spasmodic, stool softener, and modest narcotics  · Not vomit logic intervention includes local heat--aqua K-pad  · Anti-inflammatories typically cornerstone of treatment  However contraindicated given patient's history of prior GI bleed  · Patient is  stable for discharge by the primary Internal Medicine service this afternoon once pain controlled  · She has stent on string to be removed Tuesday or Wednesday  · Patient will follow-up with in the [facility] office with KUB and ultrasound in 2 months for further long-term stone management  · No further  intervention indicated this hospital stay  Subjective:   I feel like I am urinating glass      Objective:     Vitals: Blood pressure 132/82, pulse 69, temperature 98 1 °F (36 7 °C), resp  rate 22, height 5' (1 524 m), weight 80 4 kg (177 lb 4 oz), last menstrual period 11/05/2006, SpO2 97 %  ,Body mass index is 34 62 kg/m²        Intake/Output Summary (Last 24 hours) at 7/31/2022 1332  Last data filed at 7/31/2022 0833  Gross per 24 hour   Intake 1680 ml   Output 2050 ml   Net -370 ml       Physical Exam: General appearance: alert and oriented, in no acute distress, appears stated age, cooperative and no distress  Head: Normocephalic, without obvious abnormality, atraumatic  Neck: no adenopathy, no carotid bruit, no JVD, supple, symmetrical, trachea midline and thyroid not enlarged, symmetric, no tenderness/mass/nodules  Lungs: diminished breath sounds  Heart: regular rate and rhythm, S1, S2 normal, no murmur, click, rub or gallop  Abdomen: abnormal findings:  mild tenderness in the RLQ and in the lower abdomen  Extremities: extremities normal, warm and well-perfused; no cyanosis, clubbing, or edema  Pulses: 2+ and symmetric  Neurologic: Grossly normal  No external urinary drains  Stent on string  Taped  Invasive Devices  Report    Peripheral Intravenous Line  Duration           Peripheral IV 07/31/22 Left;Ventral (anterior) Forearm <1 day              Lab Results   Component Value Date    WBC 4 96 07/30/2022    HGB 13 1 07/30/2022    HCT 38 5 07/30/2022    MCV 88 07/30/2022     07/30/2022     Lab Results   Component Value Date    SODIUM 143 07/31/2022    K 2 9 (L) 07/31/2022     07/31/2022    CO2 31 07/31/2022    BUN 9 07/31/2022    CREATININE 0 80 07/31/2022    GLUC 86 07/31/2022    CALCIUM 9 4 07/31/2022       Lab, Imaging and other studies: I have personally reviewed pertinent reports

## 2022-07-31 NOTE — DISCHARGE SUMMARY
[address] [facility]  Discharge- Ursula Pierson 1975, 50 y o  female MRN: [id number]  Unit/Bed#: -01 Encounter: [id number]  Primary Care Provider: Queen Adilene MD   Date and time admitted to hospital: 7/30/2022  1:45 AM    * Hydronephrosis with urinary obstruction due to ureteral calculus  Assessment & Plan  · Presented to [facility] with right flank pain, nausea/vomiting  CT abdomen/pelvis revealing right hydronephrosis secondary to tandem 3 and 4 mm ureteral calculi, additionally patient with acute renal failure however without UTI  · Seen by urology at [facility], transferred here for urology intervention not available this weekend at [facility]     · Pt post op day # 1 CYSTOSCOPY URETEROSCOPY WITH LITHOTRIPSY HOLMIUM LASER, RETROGRADE PYELOGRAM AND INSERTION STENT URETERAL (Right)  · Stent on string will have removed on Tuesday or Wednesday - will fu in [facility] office with KUB and [address] in 2 months for further long term stone mgmt per urology   · This am pt with reports of urinating sensation feeling like a piece of glass   · Voiding pink urine no noted clots /pt reports possibly fragment of stone passed this am   · Did administer low dose Toradol iv once due to pain this am (renal function improved)    · Continue with pain control regimen and antiemetics  · Continue Flomax  · Discussed with urology AP pt received stent colic cocktail (hydration/ stool softner and modest narcotic)   · Blood cultures obtained at [facility] negative new set ordered slb - in process   · No urine cultures - afebrile/ no leukocytosis today improved since admission      Hypokalemia  Assessment & Plan  Pt noted after am labs to have hypokalemia 2 9  · Ordered po this am 40meq iv hung at around 1700 will dc since pt reports pain with iv potassium   · Will administer additional 40meq po now before discharge pt feeling a little better     SWETA (acute kidney injury) ([facility] [address] )  Assessment & Plan  · POA to [facility] ED, baseline appearing 0 7-0 8  · Improving with IV fluids, continue  · Urology evaluation of hydronephrosis secondary to ureterolithiasis as above  · Now sp stent placement   · Will fu with urology outpt   · However, will monitor overnight - dc in am   · Measure PVR and bladder scan, UA at [facility] with 4-10 RBCs, large bilirubin  · Avoid nephrotoxins and avoid hypotension        Medical Problems             Resolved Problems  Date Reviewed: 7/31/2022   None               Discharging Physician / Practitioner: RYLEY Rogers  PCP: Emanuel Villa MD  Admission Date:   Admission Orders (From admission, onward)     Ordered        07/30/22 0152  Inpatient Admission  Once                      Discharge Date: 07/31/22    Consultations During Hospital Stay:  · Dr Hugh Cohn - urology     Procedures Performed:   · Creatinine 1 72, 1 32, 0 68, 0 80  · UA not result culture  · 7/28/22 COVID/influenza a/influenza B/RSV negative  · Blood cultures 07/28/2022:  Growth at 48 hours  · Blood cultures 07/30/2022: In progress  · CT abdomen and pelvis with contrast 07/29/2022:  Mild right hydronephrosis and proximal hydroureter   Tandem obstructing calculi in the right ureter measuring 3 and 4 mm  · 07/30/2022:CYSTOSCOPY URETEROSCOPY WITH LITHOTRIPSY HOLMIUM LASER, RETROGRADE PYELOGRAM AND INSERTION STENT URETERAL (Right)    Significant Findings / Test Results:   · See    Incidental Findings:   · None    Test Results Pending at Discharge (will require follow up): · Blood cultures x2 sets still in process     Outpatient Tests Requested:  · Call to schedule follow-up with Urology in 5 days  · Call to schedule follow-up with PCP within the next week    Complications:  None    Reason for Admission:  Right flank pain, obstructing ureteral stone    Hospital Course: Cely Perez is a 50 y o  female patient who originally presented to the hospital on 7/30/2022 due to right flank pain/obstructing ureteral stone    Patient with presented to [facility] on 07/28/2022 with right-sided flank pain  Patient reports this had become worse over the past 3 days and was associated with increased nausea and vomiting in on day of presentation patient noted intractable vomiting with pain  She denied any fevers or chills or known sick contacts  She was evaluated at [facility] a CT renal stone study revealed obstructing tandem ureteral stones causing mild right hydronephrosis  Her labs indicating an acute SWETA  This was discussed with the ED physician and Neurology who advised transfer to [facility] for intervention not available at [facility] on the weekend  Upon arrival patient continued to feel somewhat uncomfortable with a 6/10 pain reported that she still had some residual nausea but no further vomiting  Patient was noted on admission per Urology review to be afebrile and normotensive but very positively symptomatic  Her creatinine at 1 7 with improvement on IV fluids  Patient's urinalysis is negative and therefore did not go to culture  White count was mildly elevated at 10 7 improved with IV fluid hydration  On 07/30/2022 patient underwent cystoscopy ureteroscopy with lithotripsy and laser retrograde pyelogram insertion of right ureteral stent  Patient progressively improved overnight however upon arrival to room this morning patient reported that she was urinating what felt to be glass  She felt that she had seen small fragment of stone when voiding in hat  Urine remains pink in color  And patient's potassium was noted to be 2 9 this morning  Patient received a total of 80 mEq of K-Dur during course of day on discharge  And patient's renal function improved  Patient receive some stent colic cocktail per Urology request to limit anti-inflammatory secondary to prior GI bleed  Patient does have a stent on string and this is to be removed on Tuesday or Wednesday    Patient has been instructed to follow-up in [place] office with KUB and ultrasound in 2 months for further long-term stone management  And once patient's pain had improved patient was stable for discharge  I returned to evaluate patient in her room she reports feeling a little better but still feels that she would do better at home  Recommendations to hydrate well and follow above mentioned recommendations, patient medically stable for discharge to home  Patient's significant other at bedside      Please see above list of diagnoses and related plan for additional information  Condition at Discharge: fair    Discharge Day Visit / Exam:   Subjective: This a m  Patient in tears noting significant discomfort with voiding and feeling as if she is passing glass  Patient receive some medications for pain control with some improvement and later this afternoon although patient reports not feeling great she was emphatic that she wanted to return home  Vitals: Blood Pressure: 120/76 (07/31/22 1554)  Pulse: 71 (07/31/22 1554)  Temperature: 98 1 °F (36 7 °C) (07/31/22 1118)  Temp Source: Temporal (07/30/22 0859)  Respirations: 22 (07/31/22 1118)  Height: 5' (152 4 cm) (07/30/22 0154)  Weight - Scale: 80 4 kg (177 lb 4 oz) (07/30/22 0154)  SpO2: 96 % (07/31/22 1554)  Exam:   Physical Exam  Constitutional:       General: She is not in acute distress  Appearance: She is not ill-appearing, toxic-appearing or diaphoretic  HENT:      Head: Normocephalic and atraumatic  Nose: No congestion  Eyes:      General:         Right eye: No discharge  Left eye: No discharge  Conjunctiva/sclera: Conjunctivae normal    Cardiovascular:      Rate and Rhythm: Normal rate  Heart sounds: No murmur heard  No friction rub  No gallop  Pulmonary:      Effort: No respiratory distress  Breath sounds: No stridor  No wheezing, rhonchi or rales  Chest:      Chest wall: No tenderness  Abdominal:      General: There is no distension  Palpations: There is no mass  Tenderness: There is no abdominal tenderness  There is no guarding or rebound  Hernia: No hernia is present  Skin:     Coloration: Skin is not jaundiced or pale  Findings: No bruising, erythema, lesion or rash  Neurological:      Mental Status: She is alert and oriented to person, place, and time  Psychiatric:      Comments: Clearly distraught          Discussion with Family: Updated  (significant other) at bedside  Discharge instructions/Information to patient and family:   See after visit summary for information provided to patient and family  Provisions for Follow-Up Care:  See after visit summary for information related to follow-up care and any pertinent home health orders  Disposition:   Home    Planned Readmission:  No plan for readmission     Discharge Statement:  I spent 50 minutes discharging the patient  This time was spent on the day of discharge  I had direct contact with the patient on the day of discharge  Greater than 50% of the total time was spent examining patient, answering all patient questions, arranging and discussing plan of care with patient as well as directly providing post-discharge instructions  Additional time then spent on discharge activities  Discharge Medications:  See after visit summary for reconciled discharge medications provided to patient and/or family        **Please Note: This note may have been constructed using a voice recognition system**

## 2022-07-31 NOTE — ASSESSMENT & PLAN NOTE
· POA to [facility] ED, baseline appearing 0 7-0 8  · Improving with IV fluids, continue      · Urology evaluation of hydronephrosis secondary to ureterolithiasis as above  · Now sp stent placement   · Will fu with urology outpt   · However, will monitor overnight - dc in am   · Measure PVR and bladder scan, UA at [facility] with 4-10 RBCs, large bilirubin  · Avoid nephrotoxins and avoid hypotension

## 2022-07-31 NOTE — NURSING NOTE
Pt cleared for discharge home at this time  D/C instructions reviewed with and given to pt  Scripts sent to preferred pharmacy  All questions and concerns addressed at this time  IV site and Gina removed  PO potassium given  Belongings gathered  Pt escorted home by significant other

## 2022-07-31 NOTE — PROGRESS NOTES
Daron Reyes at bedside  Pt with c/o IV potassium burning  IV pumped stopped by Daron Reyes  Stated to stop IV potassium, and she will order more PO potassium prior to discharge

## 2022-07-31 NOTE — CASE MANAGEMENT
Case Management Assessment & Discharge Planning Note    Patient name Ursula Pierson  Location /-06 MRN [id number]  : 1975 Date 2022       Current Admission Date: 2022  Current Admission Diagnosis:Hydronephrosis with urinary obstruction due to ureteral calculus   Patient Active Problem List    Diagnosis Date Noted    Obesity (BMI 30-39 9) 2022    Anxiety 2022    Depression 2022    Hydronephrosis with urinary obstruction due to ureteral calculus 2022    SWETA (acute kidney injury) ([facility] [address] ) 2022    Electrolyte abnormality 2022    Diverticulitis 10/18/2018    Dependence on nicotine from cigarettes 2018    Flank pain 2018    Constipation 2018    Rectal bleed 2018    Zoster 10/18/2016    Headache 10/18/2016    Neuropathy 10/18/2016      LOS (days): 1  Geometric Mean LOS (GMLOS) (days):   Days to GMLOS:     OBJECTIVE:    Risk of Unplanned Readmission Score: 6 67         Current admission status: Inpatient       Preferred Pharmacy:   [address] [place], [place] [address]n [facility], [address] [place] [address] ANABEL 2  [facility] [address]  [address] [place] [id number]  Phone: [phone] Fax: [phone]    Primary Care Provider: Queen Adilene MD    Primary Insurance: [facility]  Secondary Insurance:     ASSESSMENT:  Sabi Schmitt Proxies    There are no active Health Care Proxies on file  Readmission Root Cause  30 Day Readmission: No    Patient Information  Admitted from[de-identified] Home  Mental Status: Alert  During Assessment patient was accompanied by: Not accompanied during assessment  Assessment information provided by[de-identified] Patient  Primary Caregiver: Self  Support Systems: Spouse/significant other  [place] of Residence: Other (specify in comment box) ([place])  [place]  Home entry access options   Select all that apply : Stairs  Number of steps to enter home : 4  Type of Current Residence: Apartment  Floor Level: 1  Upon entering residence, is there a bedroom on the main floor (no further steps)?: Yes  Upon entering residence, is there a bathroom on the main floor (no further steps)?: Yes  In the last 12 months, was there a time when you were not able to pay the mortgage or rent on time?: No  In the last 12 months, how many places have you lived?: 1  In the last 12 months, was there a time when you did not have a steady place to sleep or slept in a shelter (including now)?: No  Homeless/housing insecurity resource given?: No  Living Arrangements: Lives w/ Spouse/significant other    Activities of Daily Living Prior to Admission  Functional Status: Independent  Completes ADLs independently?: Yes  Ambulates independently?: Yes  Does patient use assisted devices?: No  Does patient currently own DME?: No  Does patient have a history of Outpatient Therapy (PT/OT)?: No  Does the patient have a history of Short-Term Rehab?: No  Does patient have a history of HHC?: No  Does patient currently have [facility]?: No     Patient Information Continued  Income Source: Employed  Does patient have prescription coverage?: Yes  Within the past 12 months, you worried that your food would run out before you got the money to buy more : Never true  Within the past 12 months, the food you bought just didn't last and you didn't have money to get more : Never true  Food insecurity resource given?: No  Does patient receive dialysis treatments?: No  Does patient have a history of substance abuse?: No  Does patient have a history of Mental Health Diagnosis?: Yes (PTSD, Anxiety, Depression, Bipolar)  Is patient receiving treatment for mental health?: Yes  Has patient received inpatient treatment related to mental health in the last 2 years?: No     Means of Transportation  Means of Transport to Appts[de-identified] Drives Self  In the past 12 months, has lack of transportation kept you from medical appointments or from getting medications?: No  In the past 12 months, has lack of transportation kept you from meetings, work, or from getting things needed for daily living?: No  Was application for public transport provided?: No    DISCHARGE DETAILS:    Discharge planning discussed with[de-identified] Patient  Freedom of Choice: Yes     CM contacted family/caregiver?: No- see comments (Pt alert and oriented)     Treatment Team Recommendation: Home  Discharge Destination Plan[de-identified] Home  Transport at Discharge : Family 27-Feb-2017 10:12

## 2022-08-01 ENCOUNTER — NURSE TRIAGE (OUTPATIENT)
Dept: OTHER | Facility: OTHER | Age: 47
End: 2022-08-01

## 2022-08-01 ENCOUNTER — TELEPHONE (OUTPATIENT)
Dept: UROLOGY | Facility: AMBULATORY SURGERY CENTER | Age: 47
End: 2022-08-01

## 2022-08-01 NOTE — TELEPHONE ENCOUNTER
Pt called In stating she was given Hydrocodone on 7 30 when discharged from the hospital due to having a stent placed  Pt stated the instructions on the bottle are different from her discharge summery  Rx SIG states for pt to take one tablet every 4 hours as needed for up to 2 days  Pt stated she only received 5 pills and this will not last her until she is seen on Wednesday 8 3 22 to have it removed  Dr Lorraine Ambrosio is her urologist  Pt aware the office is closed for the night and this kind of Rx would need to be addressed in the AM by Dr Lorraine Ambrosio  Pt verbalized understanding and still has 4 pills left  Appt  Is at [facility] location  Will tasked there

## 2022-08-01 NOTE — UTILIZATION REVIEW
Notification of Discharge   This is a Notification of Discharge from our facility [address]  Please be advised that this patient has been discharge from our facility  Below you will find the admission and discharge date and time including the patients disposition  UTILIZATION REVIEW CONTACT:  Pancho Brink  Utilization   Network Utilization Review Department  Phone: [phone] x carefully listen to the prompts  All voicemails are confidential   Email: [email or web address]     PHYSICIAN ADVISORY SERVICES:  FOR [id number] REVIEW - MEDICAL NECESSITY DENIAL  Phone: [phone]  Fax: [phone]  Email: [email or web address]     PRESENTATION DATE: 7/30/2022  1:45 AM  OBERVATION ADMISSION DATE:   INPATIENT ADMISSION DATE: 7/30/22  1:45 AM   DISCHARGE DATE: 7/31/2022  7:12 PM  DISPOSITION: Home/Self Care Home/Self Care      IMPORTANT INFORMATION:  Send all requests for admission clinical reviews, approved or denied determinations and any other requests to dedicated fax number below belonging to the campus where the patient is receiving treatment   List of dedicated fax numbers:  [address] DENIALS (Administrative/Medical Necessity) [phone]   [address]  (Maternity/NICU/Pediatrics) [phone]   [facility] [phone]   [address] [facility] [phone]   [address] [place] [address] [phone]   [address] [facility] [address] [place] [address],[address] [phone]   [facility]  [phone]   [address] [facility], [facility]  [address] [facility] 1000 [facility] [phone]

## 2022-08-01 NOTE — TELEPHONE ENCOUNTER
Advised of follow up appointment already scheduled, but patient was unaware  Follow up appointment was rescheduled  Patient had scheduled her US for Wednesday (8/3)  Advised this would need to be also rescheduled for middle of September so it can be reviewed at her follow up appointment  Central scheduling number was provided to reschedule US  Chace Padilla is a walk-in and she can go to any [facility] facility to have this done  Patient is understanding  Advised to contact the office in the meantime with any questions or concerns

## 2022-08-01 NOTE — TELEPHONE ENCOUNTER
Post Op Note    Chidi Edouard is a 50 y o  female s/p CYSTOSCOPY URETEROSCOPY WITH LITHOTRIPSY HOLMIUM LASER, RETROGRADE PYELOGRAM AND INSERTION STENT URETERAL (Right Bladder) performed 7/30/2022  Chidi Edouard had surgery by Dr Haleigh Carney      How would you rate your pain on a scale from 1 to 10, 10 being the worst pain ever? 0     Do you have any difficulty urinating? No     Do you have any other questions or concerns that I can address at this time? Spoke to patient and she states she is doing well after surgery  She states she does have some pain but is relieved with the pain medication prescribed by the surgeon and OTC pain medications  Advised if patient would be comfortable removing the stent out on her own or if she would rather come into the office  Appointment has been scheduled on Wednesday at the [facility] office, per patient's request     Joe Segovia of follow up appointment already scheduled, but patient was unaware  Follow up appointment was rescheduled  Patient had scheduled her US for Wednesday (8/3)  Advised this would need to be also rescheduled for middle of September so it can be reviewed at her follow up appointment  Central scheduling number was provided to reschedule US  Archie Cárdenas is a walk-in and she can go to any [facility]'s facility to have this done  Patient is understanding  Advised to contact the office in the meantime with any questions or concerns

## 2022-08-01 NOTE — UTILIZATION REVIEW
Inpatient Admission Authorization Request   NOTIFICATION OF INPATIENT ADMISSION/INPATIENT AUTHORIZATION REQUEST   SERVICING FACILITY:   [facility]  Address: [address] [facility], [address] [id number]  Tax ID: [id number]  NPI: [id number]  Place of Service: Inpatient [address] [facility] Code: 24     ATTENDING PROVIDER:  Attending Name and NPI#: Geraldine Yañez [[id number]]  Address: [address] [place] [address], [address] [place] [address]  Phone: [phone]     UTILIZATION REVIEW CONTACT:  Natalia Sparks Utilization   Network Utilization Review Department  Phone: [phone]  Fax: [phone]  Email: [email or web address]     PHYSICIAN ADVISORY SERVICES:  FOR [id number] REVIEW - MEDICAL NECESSITY DENIAL  Phone: [phone]  Fax: [phone]  Email: [email or web address]     TYPE OF REQUEST:  Inpatient Status     ADMISSION INFORMATION:  ADMISSION DATE/TIME: 7/30/22  1:45 AM  PATIENT DIAGNOSIS CODE/DESCRIPTION:  Ureterolithiasis [N20 1]  DISCHARGE DATE/TIME: 7/31/2022  7:12 PM   IMPORTANT INFORMATION:  Please contact Natalia Sparks directly with any questions or concerns regarding this request  Department voicemails are confidential     Send requests for admission clinical reviews, concurrent reviews, approvals, and administrative denials due to lack of clinical to fax [phone]

## 2022-08-01 NOTE — LETTER
August 8, 2022     Patient: Shruthi Magaña  YOB: 1975  Date of Visit: 8/1/2022      To Whom it May Concern: Elena Lund is under my professional care  Zuleika Blanca was seen by us on 07/30/22  Zuleika Blanca may return to school on 08/08/2022  If you have any questions or concerns, please don't hesitate to call           Sincerely,          Ike Rucker PA-C

## 2022-08-01 NOTE — TELEPHONE ENCOUNTER
Regarding: medication insructions/ clarification   ----- Message from Carlyn Henriquez sent at 8/1/2022  4:18 PM EDT -----  Pt called, " I was discharged from the hospital yesterday and when I went to  my medication, the instructions on my summary is not the same as the instructions on my medication bottle "

## 2022-08-01 NOTE — TELEPHONE ENCOUNTER
Reason for Disposition   Prescription refill request for a CONTROLLED substance (e g , narcotics, ADHD medicines)    Answer Assessment - Initial Assessment Questions  1  NAME of MEDICATION: "What medicine are you calling about?"      Hydrocodone   2  QUESTION: "What is your question?" (e g , medication refill, side effect)      "The instructions on my discharge summery does not match the instructions on the bottle  I only have 5 pills and I should have 10    3  PRESCRIBING HCP: "Who prescribed it?" Reason: if prescribed by specialist, call should be referred to that group  Urology   4   SYMPTOMS: "Do you have any symptoms?"      Stent pain    Protocols used: MEDICATION QUESTION CALL-ADULT-

## 2022-08-01 NOTE — TELEPHONE ENCOUNTER
Please Triage  New Patient    What is the reason for the patients appointment? Patient was in the hospital for obstructing Kidney stones and a stent was placed  She needs to have the stent removed    What office location does the patient prefer? [place]     Imaging/Lab Results: epic    Do we accept the patient's insurance or is the patient Self-Pay? Insurance Provider: [employer]   Plan Type/Number:  Member ID#: Has the patient had any previous Urologist(s)? no    Have patient records been requested? If not are records showing in Epic: epic    Has the patient had any outside testing done? epic    Does the patient have a personal history of cancer?  Epic    Patient can be reached at [phone]

## 2022-08-02 ENCOUNTER — TELEPHONE (OUTPATIENT)
Dept: OTHER | Facility: OTHER | Age: 47
End: 2022-08-02

## 2022-08-02 NOTE — TELEPHONE ENCOUNTER
I thought they screwed up one of my prescriptions when they refilled it however I "got it straightened out"

## 2022-08-02 NOTE — TELEPHONE ENCOUNTER
Called and spoke to patient  Inform patient of Kaushik HEDRICK's message below    Patient verbalized understanding

## 2022-08-02 NOTE — TELEPHONE ENCOUNTER
Narcotic was prescribed for management of severe post op pain  Recommend patient take otc NSAIDs as needed, and continue oxybutynin and Flomax  Patient should also avoid constipation with narcotics as this can worsen stent colic

## 2022-08-03 LAB
BACTERIA BLD CULT: NORMAL
BACTERIA BLD CULT: NORMAL

## 2022-08-03 NOTE — TELEPHONE ENCOUNTER
Spoke to patient and she states she was able to remove the stent out on her own  She states she is just feeling crampy at this time, but she is taking Tylenol that is helping with it  Advised of keeping up with the pain medication at least for the first few days as well as hydration  She is understanding  Advised of follow up appointment scheduled in October  She is aware  Advised to contact the office with any questions or concerns

## 2022-08-04 LAB
BACTERIA BLD CULT: NORMAL
BACTERIA BLD CULT: NORMAL
CALCIUM OXALATE DIHYDRATE MFR STONE IR: 60 %
COLOR STONE: NORMAL
COM MFR STONE: 20 %
COMMENT-STONE3: NORMAL
COMPOSITION: NORMAL
HYDROXYAPATITE 24H ENGDIFF UR: 20 %
LABORATORY COMMENT REPORT: NORMAL
PHOTO: NORMAL
SIZE STONE: NORMAL MM
SPEC SOURCE SUBJ: NORMAL
STONE ANALYSIS-IMP: NORMAL
WT STONE: 35 MG

## 2022-08-05 ENCOUNTER — HOSPITAL ENCOUNTER (OUTPATIENT)
Dept: CT IMAGING | Facility: HOSPITAL | Age: 47
Discharge: HOME | End: 2022-08-05
Attending: UROLOGY
Payer: COMMERCIAL

## 2022-08-05 ENCOUNTER — HOSPITAL ENCOUNTER (OUTPATIENT)
Dept: RADIOLOGY | Facility: HOSPITAL | Age: 47
Discharge: HOME | End: 2022-08-05

## 2022-08-05 DIAGNOSIS — N20.1 URETERAL CALCULUS: ICD-10-CM

## 2022-08-05 DIAGNOSIS — N13.39 OTHER HYDRONEPHROSIS: ICD-10-CM

## 2022-08-05 DIAGNOSIS — N23 RENAL COLIC ON RIGHT SIDE: ICD-10-CM

## 2022-08-05 PROCEDURE — G1004 CDSM NDSC: HCPCS

## 2022-08-05 PROCEDURE — 74176 CT ABD & PELVIS W/O CONTRAST: CPT

## 2022-08-05 PROCEDURE — 74018 RADEX ABDOMEN 1 VIEW: CPT

## 2022-08-05 NOTE — TELEPHONE ENCOUNTER
Spoke to New pt s/p stent removal by pt on 8/1/22  She states since this morning she has been having sharp shooting pain level 5 in right flank  She states she doesn't feel well and is nauseous  She is taking tylenol with no relief and hydrating well  She doesn't have a thermometer to take her temp  Pt is s/p cysto/URS/stent on 7/30/22 with Dr Raulito Claros to provider for recommendations

## 2022-08-05 NOTE — TELEPHONE ENCOUNTER
Pt called in stating she's not feeling well since her stent removal  She says she feels like she did when she had kidney stones  She is requesting a call back

## 2022-08-05 NOTE — PROGRESS NOTES
Patient is s/p right ureteroscopy, laser lithotripsy with placement of stent on 07/30/2022 with Dr Jose F Howard   Her stent was removed 08/03/2022 and now complains of sharp right flank pain with nausea and vomiting  Will order STAT CT stone protocol and KUB to rule out recurring or migrated right ureteral calculus with recurring hydronephrosis  On-call AP was notified BY NURSING STAFF to follow up the results this evening

## 2022-08-05 NOTE — TELEPHONE ENCOUNTER
Called and spoke with patient at this time  Reviewed provider recommendations for ever symptoms  She states as her pain is currently 5/10 she was hoping to avoid the ER  She was wondering if she could get imaging instead to try and avoid ER  Advised I would consult with provider in office and call her back  Called [facility] office and Dr José Miguel Estes able to place STAT CT and KUB  TT sent to on call provider [place]) to be on the lookout for results  Called and spoke with patient, advised of orders placed  She knows to call central scheduling at [phone]  Advised someone from our office will get the results and she will be advised of results thereafter  She verbalized under standing and was thankful for call back

## 2022-08-08 NOTE — TELEPHONE ENCOUNTER
Called and spoke to patient  Informed patient of Dell Reyes PACs message below  Patient is feeling much better today  Patient questioned if she could have note for missing school  Informed patient we could most likely write an excuse note from the day patient had surgery until stent is removed, informed her I would route to the provider to confirm this and we can then send it through my chart

## 2022-08-08 NOTE — TELEPHONE ENCOUNTER
CT is negative for any obstructing stone does show some mild right-sided hydronephrosis which is likely present due to recently removed stone and stent  No acute finding seen on CT  She is seen to be constipated on KUB  Recommend ultrasound prior to her visit in October to ensure resolution of hydronephrosis

## 2023-08-16 NOTE — TELEPHONE ENCOUNTER
GI rounded spoke F2F, pt will have EGD tomorrow, NPO midnight Patient does have a stent on string and this is to be removed on Tuesday or Wednesday   Will call to confirm 2m f/u w/US/KUB     Need US and KUB orders placed please

## (undated) DEVICE — CATH URETERAL 5FR X 70 CM FLEX TIP POLYUR BARD

## (undated) DEVICE — GLOVE SRG BIOGEL ORTHOPEDIC 7.5

## (undated) DEVICE — FIBER STD QUANTA 365 MICRON

## (undated) DEVICE — BASKET STONE RTRVL PLTN CL 3FR 115CM

## (undated) DEVICE — SPECIMEN CONTAINER STERILE PEEL PACK

## (undated) DEVICE — PACK TUR

## (undated) DEVICE — GUIDEWIRE STRGHT TIP 0.035 IN  SOLO PLUS